# Patient Record
Sex: FEMALE | Race: WHITE | NOT HISPANIC OR LATINO | Employment: OTHER | URBAN - NONMETROPOLITAN AREA
[De-identification: names, ages, dates, MRNs, and addresses within clinical notes are randomized per-mention and may not be internally consistent; named-entity substitution may affect disease eponyms.]

---

## 2017-01-04 ENCOUNTER — HOSPITAL ENCOUNTER (OUTPATIENT)
Dept: MAMMOGRAPHY | Facility: HOSPITAL | Age: 62
Discharge: HOME | End: 2017-01-04
Admitting: GENERAL PRACTICE

## 2017-01-04 DIAGNOSIS — Z12.31 ENCOUNTER FOR SCREENING MAMMOGRAM FOR BREAST CANCER: ICD-10-CM

## 2017-01-04 PROCEDURE — 77063 BREAST TOMOSYNTHESIS BI: CPT

## 2017-01-04 PROCEDURE — G0202 SCR MAMMO BI INCL CAD: HCPCS

## 2017-02-08 ENCOUNTER — TRANSCRIBE ORDERS (OUTPATIENT)
Dept: ADMINISTRATIVE | Facility: HOSPITAL | Age: 62
End: 2017-02-08

## 2017-02-08 DIAGNOSIS — G47.30 SLEEP APNEA, UNSPECIFIED TYPE: Primary | ICD-10-CM

## 2017-02-08 DIAGNOSIS — R06.89 GASPING FOR BREATH: ICD-10-CM

## 2017-03-27 ENCOUNTER — TELEPHONE (OUTPATIENT)
Dept: NEUROLOGY | Age: 62
End: 2017-03-27

## 2017-04-22 ENCOUNTER — APPOINTMENT (OUTPATIENT)
Dept: GENERAL RADIOLOGY | Facility: HOSPITAL | Age: 62
End: 2017-04-22

## 2017-04-22 PROCEDURE — 71020 HC CHEST PA AND LATERAL: CPT

## 2017-05-19 ENCOUNTER — TELEPHONE (OUTPATIENT)
Dept: NEUROLOGY | Age: 62
End: 2017-05-19

## 2017-05-19 ENCOUNTER — OFFICE VISIT (OUTPATIENT)
Dept: NEUROLOGY | Age: 62
End: 2017-05-19
Payer: MEDICARE

## 2017-05-19 VITALS
WEIGHT: 152 LBS | BODY MASS INDEX: 26.93 KG/M2 | DIASTOLIC BLOOD PRESSURE: 90 MMHG | SYSTOLIC BLOOD PRESSURE: 145 MMHG | HEART RATE: 82 BPM | OXYGEN SATURATION: 99 % | HEIGHT: 63 IN

## 2017-05-19 DIAGNOSIS — Z78.9 DIFFICULTY USING BIPHASIC POSITIVE AIRWAY PRESSURE (BIPAP) MACHINE: ICD-10-CM

## 2017-05-19 DIAGNOSIS — G47.33 OBSTRUCTIVE SLEEP APNEA: Primary | ICD-10-CM

## 2017-05-19 PROCEDURE — G8419 CALC BMI OUT NRM PARAM NOF/U: HCPCS | Performed by: PHYSICIAN ASSISTANT

## 2017-05-19 PROCEDURE — 1036F TOBACCO NON-USER: CPT | Performed by: PHYSICIAN ASSISTANT

## 2017-05-19 PROCEDURE — 99203 OFFICE O/P NEW LOW 30 MIN: CPT | Performed by: PHYSICIAN ASSISTANT

## 2017-05-19 PROCEDURE — 3017F COLORECTAL CA SCREEN DOC REV: CPT | Performed by: PHYSICIAN ASSISTANT

## 2017-05-19 PROCEDURE — G8427 DOCREV CUR MEDS BY ELIG CLIN: HCPCS | Performed by: PHYSICIAN ASSISTANT

## 2017-05-19 PROCEDURE — 3014F SCREEN MAMMO DOC REV: CPT | Performed by: PHYSICIAN ASSISTANT

## 2017-05-19 RX ORDER — LORAZEPAM 0.5 MG/1
TABLET ORAL
COMMUNITY
Start: 2017-05-18

## 2017-05-19 RX ORDER — ESTRADIOL 0.05 MG/D
PATCH TRANSDERMAL
COMMUNITY
Start: 2017-04-03

## 2017-05-19 RX ORDER — HYDROCODONE BITARTRATE AND ACETAMINOPHEN 7.5; 325 MG/1; MG/1
TABLET ORAL
COMMUNITY
Start: 2017-05-10 | End: 2017-05-19

## 2017-05-19 RX ORDER — AZELASTINE 1 MG/ML
SPRAY, METERED NASAL
COMMUNITY
Start: 2017-05-10

## 2017-05-19 RX ORDER — FLUTICASONE PROPIONATE 50 MCG
SPRAY, SUSPENSION (ML) NASAL
COMMUNITY
Start: 2017-05-10

## 2017-05-19 RX ORDER — MONTELUKAST SODIUM 10 MG/1
TABLET ORAL
COMMUNITY
Start: 2017-02-09

## 2017-05-19 RX ORDER — OMEPRAZOLE 40 MG/1
CAPSULE, DELAYED RELEASE ORAL
COMMUNITY
Start: 2017-04-10

## 2017-05-19 RX ORDER — SIMVASTATIN 40 MG
TABLET ORAL
COMMUNITY
Start: 2016-10-13

## 2017-05-31 ENCOUNTER — HOSPITAL ENCOUNTER (OUTPATIENT)
Dept: SLEEP CENTER | Age: 62
Discharge: HOME | End: 2017-05-31
Payer: MEDICARE

## 2017-05-31 PROCEDURE — G0399 HOME SLEEP TEST/TYPE 3 PORTA: HCPCS

## 2017-05-31 PROCEDURE — 95806 SLEEP STUDY UNATT&RESP EFFT: CPT | Performed by: PSYCHIATRY & NEUROLOGY

## 2017-11-13 ENCOUNTER — OFFICE VISIT (OUTPATIENT)
Dept: NEUROLOGY | Age: 62
End: 2017-11-13
Payer: MEDICARE

## 2017-11-13 VITALS
BODY MASS INDEX: 28.21 KG/M2 | OXYGEN SATURATION: 100 % | WEIGHT: 159.2 LBS | HEIGHT: 63 IN | SYSTOLIC BLOOD PRESSURE: 156 MMHG | DIASTOLIC BLOOD PRESSURE: 79 MMHG | HEART RATE: 91 BPM

## 2017-11-13 DIAGNOSIS — R53.83 OTHER FATIGUE: ICD-10-CM

## 2017-11-13 DIAGNOSIS — R06.81 WITNESSED EPISODE OF APNEA: Primary | ICD-10-CM

## 2017-11-13 DIAGNOSIS — R40.0 DAYTIME SOMNOLENCE: ICD-10-CM

## 2017-11-13 DIAGNOSIS — G47.09 OTHER INSOMNIA: ICD-10-CM

## 2017-11-13 PROCEDURE — G8427 DOCREV CUR MEDS BY ELIG CLIN: HCPCS | Performed by: PHYSICIAN ASSISTANT

## 2017-11-13 PROCEDURE — G8484 FLU IMMUNIZE NO ADMIN: HCPCS | Performed by: PHYSICIAN ASSISTANT

## 2017-11-13 PROCEDURE — 3014F SCREEN MAMMO DOC REV: CPT | Performed by: PHYSICIAN ASSISTANT

## 2017-11-13 PROCEDURE — G8417 CALC BMI ABV UP PARAM F/U: HCPCS | Performed by: PHYSICIAN ASSISTANT

## 2017-11-13 PROCEDURE — 1036F TOBACCO NON-USER: CPT | Performed by: PHYSICIAN ASSISTANT

## 2017-11-13 PROCEDURE — 99214 OFFICE O/P EST MOD 30 MIN: CPT | Performed by: PHYSICIAN ASSISTANT

## 2017-11-13 PROCEDURE — 3017F COLORECTAL CA SCREEN DOC REV: CPT | Performed by: PHYSICIAN ASSISTANT

## 2017-11-13 RX ORDER — MAGNESIUM HYDROXIDE/ALUMINUM HYDROXICE/SIMETHICONE 120; 1200; 1200 MG/30ML; MG/30ML; MG/30ML
5 SUSPENSION ORAL PRN
COMMUNITY

## 2017-11-13 RX ORDER — OXYCODONE AND ACETAMINOPHEN 7.5; 325 MG/1; MG/1
1 TABLET ORAL 3 TIMES DAILY
COMMUNITY
Start: 2017-11-07

## 2017-11-13 NOTE — PATIENT INSTRUCTIONS
Patient Education     Instructions:    1. Melatonin 3 mg take 2 hours before bedtime  2. Follow up with your PMD for consultation for Buspar and labs  3. Discussed that Ativan and Percocet are respiratory depressants  4. Will order an in lab sleep study  5. Will order labs to add to your PMD labs-TSH and T4 (Thyroid)     Fatigue: Care Instructions  Your Care Instructions  Fatigue is a feeling of tiredness, exhaustion, or lack of energy. You may feel fatigue because of too much or not enough activity. It can also come from stress, lack of sleep, boredom, and poor diet. Many medical problems, such as viral infections, can cause fatigue. Emotional problems, especially depression, are often the cause of fatigue. Fatigue is most often a symptom of another problem. Treatment for fatigue depends on the cause. For example, if you have fatigue because you have a certain health problem, treating this problem also treats your fatigue. If depression or anxiety is the cause, treatment may help. Follow-up care is a key part of your treatment and safety. Be sure to make and go to all appointments, and call your doctor if you are having problems. It's also a good idea to know your test results and keep a list of the medicines you take. How can you care for yourself at home? · Get regular exercise. But don't overdo it. Go back and forth between rest and exercise. · Get plenty of rest.  · Eat a healthy diet. Do not skip meals, especially breakfast.  · Reduce your use of caffeine, tobacco, and alcohol. Caffeine is most often found in coffee, tea, cola drinks, and chocolate. · Limit medicines that can cause fatigue. This includes tranquilizers and cold and allergy medicines. When should you call for help? Watch closely for changes in your health, and be sure to contact your doctor if:  · You have new symptoms such as fever or a rash. · Your fatigue gets worse. · You have been feeling down, depressed, or hopeless.  Or you doctor can tell you if your medicine may do this and if you can take it earlier in the day. If you can't sleep  · Imagine yourself in a peaceful, pleasant scene. Focus on the details and feelings of being in a place that is relaxing. · Get up and do a quiet or boring activity until you feel sleepy. · Don't drink any liquids after 6 p.m. if you wake up often because you have to go to the bathroom. Where can you learn more? Go to https://SYLOB.[employer]. org and sign in to your [employer] account. Enter N896 in the [employer] box to learn more about \"Learning About Sleeping Well. \"     If you do not have an account, please click on the \"Sign Up Now\" link. Current as of: July 26, 2016  Content Version: 11.3  © 4402-9683 [employer]. Care instructions adapted under license by [facility] ([facility]). If you have questions about a medical condition or this instruction, always ask your healthcare professional. [address] any warranty or liability for your use of this information.

## 2017-11-13 NOTE — PROGRESS NOTES
[facility] Sleep Follow Up Encounter      Information:   Patient Name: Lani Arellano  :   1955  Age:   60 y.o. MRN:   [id number]  Account #:  [de-identified]  Today:                17    Provider:  Jyoti Vazquez PA-C    Chief Complaint   Patient presents with    Sleep Apnea     Patient is having trouble sleeping        Subjective:   Lani Arellano is a 60 y.o. woman  with a history of previously diagnosed mild DEMARCO who comes in for a sleep clinic follow up. At her last office visit, 2017 she was to proceed with a repeat PSG. The previous PSG, 2017 at [facility] revealed an AHI of 7.9 and an RDI of 15.3. She was prescribed BiPAP at 14cm/10cm. She was intolerant to the BiPAP pressure and complained of abdominal bloating. The pressure was lowered to 12cm/8cm and she followed up here. She did return the BiPAP. She complained of continued fatigue and daytime drowsiness. She was referred for an HST. The HST, 2017 revealed no significant snoring or sleep apnea. She did sleep with the HOB elevated during the HST. She doesn't feel that the HST was accurate and may a false negative. She complains of insomnia both initiating and maintaining sleep. She has frequent awakenings. She wakes up every hour. She awakes with a gasp each hour. She thinks she is having apneas. She awakens with a panicky feeling. She is unsure of snoring. She has nocturia up to 3 x per night. She complains of excessive daytime somnolence. She has tried Ativan 0.5 mg 1/2 q hs with no alleviation of the insomnia. She is scheduled for labs per her PMD the end of the month. She denies chest pain, dyspnea, and palpitations. She has gained 7 lbs since the last office visit. She desires to have a repeat PSG.       Objective:     Past Medical History:   Diagnosis Date    BiPAP (biphasic positive airway pressure) dependence     14cm/10cm    Degenerative disc disease, cervical     Degenerative disc disease, lumbar     Deviated septum     GERD (gastroesophageal reflux disease)     History of multiple allergies     Hyperlipidemia     IBS (irritable bowel syndrome)     Obstructive sleep apnea     AHI:  7.9; RDI: 15.3  per PSG, 2/ 2017-[facility]; no significant snoring or apnea on HST, 5/2017    Panic attacks        Past Surgical History:   Procedure Laterality Date    HYSTERECTOMY  2000   Ankur 60    VAGINAL PROLAPSE REPAIR  2015       Recent Hospitalizations  ·     Significant Injuries  ·     History reviewed. No pertinent family history. Social History  History   Smoking Status    Former Smoker    Quit date: 2007   Smokeless Tobacco    Never Used     History   Alcohol Use No     History   Drug Use No         Current Outpatient Prescriptions   Medication Sig Dispense Refill    oxyCODONE-acetaminophen (PERCOCET) 7.5-325 MG per tablet Take 1 tablet by mouth 3 times daily .  aluminum & magnesium hydroxide-simethicone (MYLANTA) 200-200-20 MG/5ML SUSP suspension Take 5 mLs by mouth as needed for Indigestion      azelastine (ASTELIN) 0.1 % nasal spray       estradiol (CLIMARA) 0.05 MG/24HR       fluticasone (FLONASE) 50 MCG/ACT nasal spray       LORazepam (ATIVAN) 0.5 MG tablet       montelukast (SINGULAIR) 10 MG tablet       simvastatin (ZOCOR) 40 MG tablet       GuaiFENesin (MUCINEX PO) Take by mouth as needed      Hyoscyamine Sulfate (NULEV PO) Take by mouth as needed      omeprazole (PRILOSEC) 40 MG delayed release capsule       FIBER PO Take by mouth daily      Loratadine (CLARITIN PO) Take by mouth as needed       No current facility-administered medications for this visit. Allergies:  Beta adrenergic blockers; Meperidine; Morphine and related; Nsaids; Penicillins;  Tetracyclines & related; and Tramadol    REVIEW OF SYSTEMS     Constitutional: []Fever []Sweats []Chills [] Recent Injury   [x] Denies all unless marked  HENT:[]Headache  [] Head Injury  [] Sore Throat

## 2018-01-31 ENCOUNTER — TRANSCRIBE ORDERS (OUTPATIENT)
Dept: ADMINISTRATIVE | Facility: HOSPITAL | Age: 63
End: 2018-01-31

## 2018-01-31 DIAGNOSIS — Z12.31 ENCOUNTER FOR SCREENING MAMMOGRAM FOR MALIGNANT NEOPLASM OF BREAST: Primary | ICD-10-CM

## 2018-02-16 ENCOUNTER — HOSPITAL ENCOUNTER (OUTPATIENT)
Dept: MAMMOGRAPHY | Facility: HOSPITAL | Age: 63
Discharge: HOME | End: 2018-02-16
Admitting: GENERAL PRACTICE

## 2018-02-16 DIAGNOSIS — Z12.31 ENCOUNTER FOR SCREENING MAMMOGRAM FOR MALIGNANT NEOPLASM OF BREAST: ICD-10-CM

## 2018-02-16 PROCEDURE — 77063 BREAST TOMOSYNTHESIS BI: CPT

## 2018-02-16 PROCEDURE — 77067 SCR MAMMO BI INCL CAD: CPT

## 2018-03-30 ENCOUNTER — TRANSCRIBE ORDERS (OUTPATIENT)
Dept: ADMINISTRATIVE | Facility: HOSPITAL | Age: 63
End: 2018-03-30

## 2018-03-30 DIAGNOSIS — M54.16 LUMBAR RADICULOPATHY: Primary | ICD-10-CM

## 2018-04-02 ENCOUNTER — HOSPITAL ENCOUNTER (OUTPATIENT)
Dept: MRI IMAGING | Facility: HOSPITAL | Age: 63
Discharge: HOME | End: 2018-04-02
Admitting: GENERAL PRACTICE

## 2018-04-02 DIAGNOSIS — M54.16 LUMBAR RADICULOPATHY: ICD-10-CM

## 2018-04-02 PROCEDURE — 72148 MRI LUMBAR SPINE W/O DYE: CPT

## 2018-05-22 ENCOUNTER — OFFICE VISIT (OUTPATIENT)
Dept: GASTROENTEROLOGY | Facility: CLINIC | Age: 63
End: 2018-05-22

## 2018-05-22 VITALS
WEIGHT: 165 LBS | HEIGHT: 63 IN | OXYGEN SATURATION: 98 % | BODY MASS INDEX: 29.23 KG/M2 | SYSTOLIC BLOOD PRESSURE: 152 MMHG | DIASTOLIC BLOOD PRESSURE: 78 MMHG | HEART RATE: 71 BPM | TEMPERATURE: 97.2 F

## 2018-05-22 DIAGNOSIS — K59.09 CHRONIC CONSTIPATION: ICD-10-CM

## 2018-05-22 DIAGNOSIS — Z80.0 FAMILY HX OF COLON CANCER: ICD-10-CM

## 2018-05-22 DIAGNOSIS — Z86.010 HX OF COLONIC POLYP: Primary | ICD-10-CM

## 2018-05-22 PROCEDURE — 99213 OFFICE O/P EST LOW 20 MIN: CPT | Performed by: NURSE PRACTITIONER

## 2018-05-22 NOTE — PROGRESS NOTES
[facility] Gastroenterology    Primary Physician Al Ramos MD    5/22/2018    Joi Mcqueen   1955      Chief Complaint   Patient presents with   • Colonoscopy   worsening constipation     Subjective     HPI    Joi Mcqueen is a 63 y.o. female who presents with worsening constipation. Has had constipation for years.  Has worsened since 9/2017.  Taking fiber suppl daily, milk of magnesia, and prune juice daily.  She is having bm daily.   She has no complaints of nausea or vomiting.  No wt loss. No BRBPR. No melena. No abdominal pain.  She does take pain med daily for back pain.          Last colonoscopy was 4/2015, polyp, path hyperplastic, recall rec 3 yr.    The patient does  have history of colon polyps. The patient does not  have history of colon cancer.   There is no family history of colon polyps. There is  family history of colon cancer maternal aunt in her late 50's. .     Past Medical History:   Diagnosis Date   • Chronic back pain    • Chronic neck pain    • Claustrophobia    • Colon polyp    • DDD (degenerative disc disease), lumbar    • GERD (gastroesophageal reflux disease)    • Hyperlipidemia    • Hyperlipidemia    • IBS (irritable bowel syndrome)    • Low back pain    • Peptic ulcer    • Sleep apnea        Past Surgical History:   Procedure Laterality Date   • BACK SURGERY     • BLADDER SUSPENSION     • CATARACT EXTRACTION Bilateral    • COLONOSCOPY  04/16/2015   • ENDOSCOPY  02/17/2004   • HYSTERECTOMY         Outpatient Prescriptions Marked as Taking for the 5/22/18 encounter (Office Visit) with BERT Bobo   Medication Sig Dispense Refill   • azelastine (ASTELIN) 0.1 % nasal spray   9   • Calcium & Magnesium Carbonates (MYLANTA PO) Take  by mouth As Needed.     • Celecoxib (CELEBREX PO) Take  by mouth. At pharmacy. Had not started as of today     • Coenzyme Q10 (CO Q 10 PO) Take  by mouth Daily.     • estradiol (CLIMARA) 0.05 MG/24HR patch   3   •  fluticasone (FLONASE) 50 MCG/ACT nasal spray   3   • GuaiFENesin (MUCINEX PO) Take  by mouth Daily.     • Hyoscyamine Sulfate (NULEV PO) Take  by mouth As Needed.     • Loratadine (CLARITIN PO) Take  by mouth As Needed.     • LORazepam (ATIVAN) 0.5 MG tablet As Needed.     • Misc Natural Products (FIBER 7 PO) Take  by mouth.     • montelukast (SINGULAIR) 10 MG tablet   3   • oxyCODONE-acetaminophen (PERCOCET) 7.5-325 MG per tablet Take  by mouth.     • Probiotic Product (PROBIOTIC & ACIDOPHILUS EX ST PO) Take  by mouth.     • simvastatin (ZOCOR) 40 MG tablet   1       Allergies   Allergen Reactions   • Beta Adrenergic Blockers Anaphylaxis   • Penicillins Anaphylaxis   • Demerol [Meperidine] Other (See Comments)     Paul not help   • Erythromycin Other (See Comments)     Pt states she in no longer allergic   • Morphine And Related Other (See Comments)     Does not help   • Nsaids Other (See Comments)     reflux   • Tetracyclines & Related Nausea And Vomiting   • Tramadol Nausea And Vomiting       Social History     Social History   • Marital status:      Spouse name: N/A   • Number of children: N/A   • Years of education: N/A     Occupational History   • Not on file.     Social History Main Topics   • Smoking status: Former Smoker     Types: Cigarettes     Quit date: 2006   • Smokeless tobacco: Never Used   • Alcohol use No   • Drug use: No   • Sexual activity: Defer     Other Topics Concern   • Not on file     Social History Narrative   • No narrative on file       Family History   Problem Relation Age of Onset   • Hypertension Mother    • Cancer Mother         lung   • Cancer Father         leukemia   • Cancer Brother         cancer caused by asbestos   • Colon cancer Maternal Aunt    • Colon polyps Neg Hx        Review of Systems   Constitutional: Negative for chills, fever and unexpected weight change.   Respiratory: Negative for cough, shortness of breath and wheezing.    Cardiovascular: Negative for chest  pain and palpitations.   Gastrointestinal: Positive for constipation. Negative for abdominal distention, abdominal pain, anal bleeding, blood in stool, diarrhea, nausea, rectal pain and vomiting.   Musculoskeletal: Positive for back pain (chronic. ).       Objective     Vitals:    05/22/18 0909   BP: 152/78   Pulse: 71   Temp: 97.2 °F (36.2 °C)   SpO2: 98%     1    05/22/18 0909   Weight: 74.8 kg (165 lb)     Body mass index is 29.23 kg/m².    Physical Exam   Constitutional: She is oriented to person, place, and time. She appears well-developed and well-nourished. No distress.   Cardiovascular: Normal rate, regular rhythm and normal heart sounds.    Pulmonary/Chest: Effort normal and breath sounds normal.   Abdominal: Soft. Bowel sounds are normal. She exhibits no distension and no mass. There is no tenderness. There is no rebound and no guarding.   Musculoskeletal: She exhibits no edema.   Neurological: She is alert and oriented to person, place, and time.   Skin: Skin is warm and dry.   Psychiatric: She has a normal mood and affect. Her behavior is normal.   Vitals reviewed.      Imaging Results (most recent)     None          Assessment/Plan     Joi was seen today for colonoscopy.    Diagnoses and all orders for this visit:    Hx of colonic polyp  -     Case Request; Standing  -     Case Request    Chronic constipation  Comments:  worsening since 9/2017    Family hx of colon cancer    Other orders  -     Implement Anesthesia Orders Day of Procedure; Standing  -     Obtain Informed Consent; Standing  -     magnesium citrate solution; Take 4 bottles as directed         plan for colonoscopy.  In regards to worsening constipation recommend continue daily fiber suppl, prune juice, and increased water intake.  Also discussed taking miralax daily.      Body mass index is 29.23 kg/m².    Patient's Body mass index is 29.23 kg/m². BMI is above normal parameters. Recommendations include: no follow-up  required.      COLONOSCOPY WITH ANESTHESIA (N/A)  All risks, benefits, alternatives, and indications of colonoscopy procedure have been discussed with the patient. Risks to include perforation of the colon requiring possible surgery or colostomy, risk of bleeding from biopsies or removal of colon tissue, possibility of missing a colon polyp or cancer, or adverse drug reaction.  Benefits to include the diagnosis and management of disease of the colon and rectum. Alternatives to include barium enema, radiographic evaluation, lab testing or no intervention. Pt verbalizes understanding and agrees.     There are no Patient Instructions on file for this visit.    BERT Guzman      EMR Dragon/transcription disclaimer:  Much of this encounter note is electronic transcription/translation of spoken language to printed text.  The electronic translation of spoken language may be erroneous, or at times, nonsensical words or phrases may be inadvertently transcribed.  Although I have reviewed the note for such errors, some may still exist.

## 2018-05-23 PROBLEM — Z86.0100 HX OF COLONIC POLYP: Status: ACTIVE | Noted: 2018-05-23

## 2018-05-23 PROBLEM — Z86.010 HX OF COLONIC POLYP: Status: ACTIVE | Noted: 2018-05-23

## 2018-05-30 ENCOUNTER — LAB REQUISITION (OUTPATIENT)
Dept: LAB | Facility: HOSPITAL | Age: 63
End: 2018-05-30

## 2018-05-30 DIAGNOSIS — Z01.419 ENCOUNTER FOR GYNECOLOGICAL EXAMINATION WITHOUT ABNORMAL FINDING: ICD-10-CM

## 2018-05-30 PROCEDURE — G0123 SCREEN CERV/VAG THIN LAYER: HCPCS | Performed by: GENERAL PRACTICE

## 2018-05-31 ENCOUNTER — HOSPITAL ENCOUNTER (OUTPATIENT)
Facility: HOSPITAL | Age: 63
Setting detail: HOSPITAL OUTPATIENT SURGERY
Discharge: HOME | End: 2018-05-31
Attending: INTERNAL MEDICINE | Admitting: INTERNAL MEDICINE

## 2018-05-31 ENCOUNTER — ANESTHESIA EVENT (OUTPATIENT)
Dept: GASTROENTEROLOGY | Facility: HOSPITAL | Age: 63
End: 2018-05-31

## 2018-05-31 ENCOUNTER — ANESTHESIA (OUTPATIENT)
Dept: GASTROENTEROLOGY | Facility: HOSPITAL | Age: 63
End: 2018-05-31

## 2018-05-31 VITALS
WEIGHT: 160 LBS | SYSTOLIC BLOOD PRESSURE: 139 MMHG | RESPIRATION RATE: 15 BRPM | DIASTOLIC BLOOD PRESSURE: 74 MMHG | HEIGHT: 63 IN | TEMPERATURE: 97.8 F | OXYGEN SATURATION: 100 % | HEART RATE: 69 BPM | BODY MASS INDEX: 28.35 KG/M2

## 2018-05-31 DIAGNOSIS — Z86.010 HX OF COLONIC POLYP: ICD-10-CM

## 2018-05-31 LAB
GEN CATEG CVX/VAG CYTO-IMP: NORMAL
LAB AP CASE REPORT: NORMAL
LAB AP GYN ADDITIONAL INFORMATION: NORMAL
LAB AP GYN OTHER FINDINGS: NORMAL
Lab: NORMAL
PATH INTERP SPEC-IMP: NORMAL
STAT OF ADQ CVX/VAG CYTO-IMP: NORMAL

## 2018-05-31 PROCEDURE — 45385 COLONOSCOPY W/LESION REMOVAL: CPT | Performed by: INTERNAL MEDICINE

## 2018-05-31 PROCEDURE — 88305 TISSUE EXAM BY PATHOLOGIST: CPT | Performed by: INTERNAL MEDICINE

## 2018-05-31 PROCEDURE — 25010000002 PROPOFOL 10 MG/ML EMULSION: Performed by: NURSE ANESTHETIST, CERTIFIED REGISTERED

## 2018-05-31 RX ORDER — SODIUM CHLORIDE 9 MG/ML
500 INJECTION, SOLUTION INTRAVENOUS CONTINUOUS PRN
Status: DISCONTINUED | OUTPATIENT
Start: 2018-05-31 | End: 2018-05-31 | Stop reason: HOSPADM

## 2018-05-31 RX ORDER — PROPOFOL 10 MG/ML
VIAL (ML) INTRAVENOUS AS NEEDED
Status: DISCONTINUED | OUTPATIENT
Start: 2018-05-31 | End: 2018-05-31 | Stop reason: SURG

## 2018-05-31 RX ORDER — SODIUM CHLORIDE 0.9 % (FLUSH) 0.9 %
3 SYRINGE (ML) INJECTION AS NEEDED
Status: DISCONTINUED | OUTPATIENT
Start: 2018-05-31 | End: 2018-05-31 | Stop reason: HOSPADM

## 2018-05-31 RX ORDER — SODIUM CHLORIDE 9 MG/ML
100 INJECTION, SOLUTION INTRAVENOUS CONTINUOUS
Status: CANCELLED | OUTPATIENT
Start: 2018-05-31

## 2018-05-31 RX ORDER — ONDANSETRON 2 MG/ML
4 INJECTION INTRAMUSCULAR; INTRAVENOUS ONCE AS NEEDED
Status: DISCONTINUED | OUTPATIENT
Start: 2018-05-31 | End: 2018-05-31 | Stop reason: HOSPADM

## 2018-05-31 RX ORDER — SODIUM CHLORIDE 0.9 % (FLUSH) 0.9 %
1-10 SYRINGE (ML) INJECTION AS NEEDED
Status: CANCELLED | OUTPATIENT
Start: 2018-05-31

## 2018-05-31 RX ADMIN — PROPOFOL 30 MG: 10 INJECTION, EMULSION INTRAVENOUS at 08:35

## 2018-05-31 RX ADMIN — PROPOFOL 30 MG: 10 INJECTION, EMULSION INTRAVENOUS at 08:39

## 2018-05-31 RX ADMIN — LIDOCAINE HYDROCHLORIDE 0.5 ML: 10 INJECTION, SOLUTION EPIDURAL; INFILTRATION; INTRACAUDAL; PERINEURAL at 07:08

## 2018-05-31 RX ADMIN — PROPOFOL 30 MG: 10 INJECTION, EMULSION INTRAVENOUS at 08:37

## 2018-05-31 RX ADMIN — SODIUM CHLORIDE 500 ML: 9 INJECTION, SOLUTION INTRAVENOUS at 07:09

## 2018-05-31 RX ADMIN — PROPOFOL 30 MG: 10 INJECTION, EMULSION INTRAVENOUS at 08:42

## 2018-05-31 RX ADMIN — PROPOFOL 30 MG: 10 INJECTION, EMULSION INTRAVENOUS at 08:41

## 2018-05-31 RX ADMIN — PROPOFOL 70 MG: 10 INJECTION, EMULSION INTRAVENOUS at 08:33

## 2018-05-31 NOTE — ANESTHESIA POSTPROCEDURE EVALUATION
Patient: Joi Mcqueen    Procedure Summary     Date:  05/31/18 Room / Location:  [facility] ENDOSCOPY 4 / [facility] ENDOSCOPY    Anesthesia Start:  0829 Anesthesia Stop:  0852    Procedure:  COLONOSCOPY WITH ANESTHESIA (N/A ) Diagnosis:       Hx of colonic polyp      (Hx of colonic polyp [Z86.010])    Surgeon:  Jed Lutz MD Provider:  Vernon Dawn CRNA    Anesthesia Type:  general ASA Status:  2          Anesthesia Type: general  Last vitals  BP   139/77 (05/31/18 0649)   Temp   97.8 °F (36.6 °C) (05/31/18 0649)   Pulse   75 (05/31/18 0649)   Resp   18 (05/31/18 0649)     SpO2   97 % (05/31/18 0649)     Post Anesthesia Care and Evaluation    Patient location during evaluation: PHASE II  Patient participation: complete - patient participated  Level of consciousness: awake  Pain score: 0  Pain management: adequate  Airway patency: patent  Anesthetic complications: No anesthetic complications  PONV Status: none  Cardiovascular status: acceptable  Respiratory status: acceptable  Hydration status: acceptable

## 2018-05-31 NOTE — ANESTHESIA PREPROCEDURE EVALUATION
Anesthesia Evaluation     no history of anesthetic complications:  NPO Solid Status: > 8 hours  NPO Liquid Status: > 2 hours           Airway   Mallampati: I  TM distance: >3 FB  Neck ROM: full  Dental          Pulmonary - normal exam    breath sounds clear to auscultation  (+) sleep apnea (Not compliant cith CPAP),   (-) asthma, recent URI, not a smoker  Cardiovascular - normal exam  Exercise tolerance: good (4-7 METS)    Rhythm: regular  Rate: normal    (+) hyperlipidemia,   (-) pacemaker, hypertension, past MI, angina, cardiac stents, CABG      Neuro/Psych  (-) seizures, TIA, CVA  GI/Hepatic/Renal/Endo    (+)  GERD well controlled,    (-) liver disease, no renal disease, diabetes, hypothyroidism, hyperthyroidism    Musculoskeletal     Abdominal    Substance History      OB/GYN          Other                        Anesthesia Plan    ASA 2     general   total IV anesthesia  intravenous induction   Anesthetic plan and risks discussed with patient.

## 2018-06-01 LAB
CYTO UR: NORMAL
LAB AP CASE REPORT: NORMAL
Lab: NORMAL
PATH REPORT.FINAL DX SPEC: NORMAL
PATH REPORT.GROSS SPEC: NORMAL

## 2019-04-30 ENCOUNTER — TRANSCRIBE ORDERS (OUTPATIENT)
Dept: ADMINISTRATIVE | Facility: HOSPITAL | Age: 64
End: 2019-04-30

## 2019-04-30 DIAGNOSIS — Z12.31 ENCOUNTER FOR SCREENING MAMMOGRAM FOR MALIGNANT NEOPLASM OF BREAST: Primary | ICD-10-CM

## 2019-05-10 ENCOUNTER — HOSPITAL ENCOUNTER (OUTPATIENT)
Dept: MAMMOGRAPHY | Facility: HOSPITAL | Age: 64
Discharge: HOME | End: 2019-05-10
Admitting: GENERAL PRACTICE

## 2019-05-10 DIAGNOSIS — Z12.31 ENCOUNTER FOR SCREENING MAMMOGRAM FOR MALIGNANT NEOPLASM OF BREAST: ICD-10-CM

## 2019-05-10 PROCEDURE — 77067 SCR MAMMO BI INCL CAD: CPT

## 2019-05-10 PROCEDURE — 77063 BREAST TOMOSYNTHESIS BI: CPT

## 2019-10-19 ENCOUNTER — HOSPITAL ENCOUNTER (OUTPATIENT)
Dept: GENERAL RADIOLOGY | Facility: HOSPITAL | Age: 64
Discharge: HOME | End: 2019-10-19
Admitting: FAMILY MEDICINE

## 2019-10-19 PROCEDURE — 71046 X-RAY EXAM CHEST 2 VIEWS: CPT

## 2020-05-19 ENCOUNTER — OFFICE VISIT (OUTPATIENT)
Dept: INTERNAL MEDICINE | Facility: CLINIC | Age: 65
End: 2020-05-19

## 2020-05-19 ENCOUNTER — RESULTS ENCOUNTER (OUTPATIENT)
Dept: INTERNAL MEDICINE | Facility: CLINIC | Age: 65
End: 2020-05-19

## 2020-05-19 VITALS
BODY MASS INDEX: 30.12 KG/M2 | HEIGHT: 63 IN | HEART RATE: 90 BPM | WEIGHT: 170 LBS | TEMPERATURE: 97.1 F | OXYGEN SATURATION: 100 % | SYSTOLIC BLOOD PRESSURE: 122 MMHG | DIASTOLIC BLOOD PRESSURE: 74 MMHG

## 2020-05-19 DIAGNOSIS — K21.9 GASTROESOPHAGEAL REFLUX DISEASE WITHOUT ESOPHAGITIS: ICD-10-CM

## 2020-05-19 DIAGNOSIS — G47.33 OBSTRUCTIVE SLEEP APNEA: ICD-10-CM

## 2020-05-19 DIAGNOSIS — E78.2 MIXED HYPERLIPIDEMIA: ICD-10-CM

## 2020-05-19 DIAGNOSIS — K58.2 IRRITABLE BOWEL SYNDROME WITH BOTH CONSTIPATION AND DIARRHEA: Primary | ICD-10-CM

## 2020-05-19 DIAGNOSIS — K58.2 IRRITABLE BOWEL SYNDROME WITH BOTH CONSTIPATION AND DIARRHEA: ICD-10-CM

## 2020-05-19 PROBLEM — Z78.9 DIFFICULTY USING BIPHASIC POSITIVE AIRWAY PRESSURE (BIPAP) MACHINE: Status: ACTIVE | Noted: 2017-05-19

## 2020-05-19 PROBLEM — E78.5 HYPERLIPIDEMIA: Status: ACTIVE | Noted: 2020-05-19

## 2020-05-19 PROBLEM — R06.81 WITNESSED EPISODE OF APNEA: Status: ACTIVE | Noted: 2017-11-13

## 2020-05-19 PROBLEM — G47.09 OTHER INSOMNIA: Status: ACTIVE | Noted: 2017-11-13

## 2020-05-19 PROCEDURE — 99214 OFFICE O/P EST MOD 30 MIN: CPT | Performed by: INTERNAL MEDICINE

## 2020-05-19 RX ORDER — OMEPRAZOLE 20 MG/1
20 CAPSULE, DELAYED RELEASE ORAL 2 TIMES DAILY PRN
COMMUNITY
End: 2020-11-03 | Stop reason: SDUPTHER

## 2020-05-19 RX ORDER — HYOSCYAMINE SULFATE 0.125 MG
125 TABLET,DISINTEGRATING ORAL EVERY 6 HOURS PRN
Qty: 60 TABLET | Refills: 1 | Status: SHIPPED | OUTPATIENT
Start: 2020-05-19 | End: 2020-05-28

## 2020-05-19 RX ORDER — OMEPRAZOLE 20 MG/1
20 CAPSULE, DELAYED RELEASE ORAL DAILY
Qty: 90 CAPSULE | Refills: 1 | Status: SHIPPED | OUTPATIENT
Start: 2020-05-19 | End: 2020-11-03 | Stop reason: SDUPTHER

## 2020-05-19 RX ORDER — FLUTICASONE PROPIONATE 50 MCG
2 SPRAY, SUSPENSION (ML) NASAL DAILY
Qty: 16 G | Refills: 3 | Status: SHIPPED | OUTPATIENT
Start: 2020-05-19 | End: 2020-09-14

## 2020-05-19 RX ORDER — SIMVASTATIN 40 MG
40 TABLET ORAL NIGHTLY
Qty: 90 TABLET | Refills: 1 | Status: SHIPPED | OUTPATIENT
Start: 2020-05-19 | End: 2020-12-17

## 2020-05-19 RX ORDER — ESTRADIOL 0.05 MG/D
1 PATCH TRANSDERMAL WEEKLY
Qty: 12 PATCH | Refills: 2 | Status: SHIPPED | OUTPATIENT
Start: 2020-05-19 | End: 2021-01-19

## 2020-05-19 NOTE — PROGRESS NOTES
Pati Mcqueen is a 65 y.o. female.   Chief Complaint   Patient presents with   • Establish Care     new patient, needs medication refills, needs mammo order       This is a annual evaluation on patient with upper cholesterolemia irritable bowel syndrome chronic low back pain       The following portions of the patient's history were reviewed and updated as appropriate: allergies, current medications, past family history, past medical history, past social history, past surgical history and problem list.    Review of Systems   Constitutional: Negative for activity change, appetite change, fatigue, fever, unexpected weight gain and unexpected weight loss.   HENT: Negative for swollen glands, trouble swallowing and voice change.    Eyes: Negative for blurred vision and visual disturbance.   Respiratory: Negative for cough and shortness of breath.    Cardiovascular: Negative for chest pain, palpitations and leg swelling.   Gastrointestinal: Positive for GERD. Negative for abdominal pain, constipation, diarrhea, nausea, vomiting and indigestion.   Endocrine: Negative for cold intolerance, heat intolerance, polydipsia and polyphagia.   Genitourinary: Negative for dysuria and frequency.   Musculoskeletal: Positive for back pain. Negative for arthralgias, joint swelling and neck pain.   Skin: Negative for color change, rash and skin lesions.   Neurological: Negative for dizziness, weakness, headache, memory problem and confusion.   Hematological: Does not bruise/bleed easily.   Psychiatric/Behavioral: Negative for agitation, hallucinations and suicidal ideas. The patient is not nervous/anxious.        Objective   Past Medical History:   Diagnosis Date   • Chronic back pain    • Chronic neck pain    • Claustrophobia    • Colon polyp    • DDD (degenerative disc disease), lumbar    • GERD (gastroesophageal reflux disease)    • Hyperlipidemia    • Hyperlipidemia    • IBS (irritable bowel syndrome)    • Low back  pain    • Peptic ulcer    • Sleep apnea       Past Surgical History:   Procedure Laterality Date   • BACK SURGERY     • BLADDER SUSPENSION     • CATARACT EXTRACTION Bilateral    • COLONOSCOPY  04/16/2015   • COLONOSCOPY N/A 5/31/2018    Procedure: COLONOSCOPY WITH ANESTHESIA;  Surgeon: Jed Lutz MD;  Location: [facility] ENDOSCOPY;  Service: Gastroenterology   • ENDOSCOPY  02/17/2004   • HYSTERECTOMY          Current Outpatient Medications:   •  Calcium & Magnesium Carbonates (MYLANTA PO), Take 1 tablet by mouth As Needed., Disp: , Rfl:   •  estradiol (CLIMARA) 0.05 MG/24HR patch, Place 1 patch on the skin as directed by provider 1 (One) Time Per Week., Disp: 12 patch, Rfl: 2  •  fluticasone (FLONASE) 50 MCG/ACT nasal spray, 2 sprays into the nostril(s) as directed by provider Daily., Disp: 16 g, Rfl: 3  •  GuaiFENesin (MUCINEX PO), Take 1 tablet by mouth 2 (Two) Times a Day., Disp: , Rfl:   •  hyoscyamine sulfate (NuLev) 0.125 MG tablet dispersible disintegrating tablet, Place 1 tablet on the tongue Every 6 (Six) Hours As Needed (for IBS)., Disp: 60 tablet, Rfl: 1  •  Loratadine (CLARITIN PO), Take 1 tablet by mouth As Needed., Disp: , Rfl:   •  LORazepam (ATIVAN) 0.5 MG tablet, As Needed., Disp: , Rfl:   •  omeprazole (priLOSEC) 20 MG capsule, Take 20 mg by mouth 2 (Two) Times a Day As Needed., Disp: , Rfl:   •  oxyCODONE-acetaminophen (PERCOCET) 7.5-325 MG per tablet, Take 1 tablet by mouth 4 (Four) Times a Day., Disp: , Rfl:   •  simvastatin (ZOCOR) 40 MG tablet, Take 1 tablet by mouth Every Night., Disp: 90 tablet, Rfl: 1  •  omeprazole (PrilOSEC) 20 MG capsule, Take 1 capsule by mouth Daily., Disp: 90 capsule, Rfl: 1     Vitals:    05/19/20 1347   BP: 122/74   Pulse: 90   Temp: 97.1 °F (36.2 °C)   SpO2: 100%         05/19/20  1347   Weight: 77.1 kg (170 lb)     Patient's Body mass index is 30.11 kg/m². BMI is above normal parameters. Recommendations include: exercise counseling and nutrition  counseling.      Physical Exam   Constitutional: She is oriented to person, place, and time. She appears well-developed and well-nourished.   HENT:   Head: Normocephalic and atraumatic.   Right Ear: External ear normal.   Left Ear: External ear normal.   Nose: Nose normal.   Mouth/Throat: Oropharynx is clear and moist.   Eyes: Pupils are equal, round, and reactive to light. Conjunctivae and EOM are normal.   Neck: Normal range of motion. Neck supple. No thyromegaly present.   Cardiovascular: Normal rate, regular rhythm, normal heart sounds and intact distal pulses.   Pulmonary/Chest: Effort normal and breath sounds normal.   Abdominal: Soft. Bowel sounds are normal.   Lymphadenopathy:     She has no cervical adenopathy.   Neurological: She is alert and oriented to person, place, and time.   Skin: Skin is warm and dry.   Psychiatric: She has a normal mood and affect. Her behavior is normal. Thought content normal.   Nursing note and vitals reviewed.            Assessment/Plan   Diagnoses and all orders for this visit:    1. Irritable bowel syndrome with both constipation and diarrhea (Primary)  -     hyoscyamine sulfate (NuLev) 0.125 MG tablet dispersible disintegrating tablet; Place 1 tablet on the tongue Every 6 (Six) Hours As Needed (for IBS).  Dispense: 60 tablet; Refill: 1  -     Comprehensive Metabolic Panel; Future  -     Uric Acid; Future  -     Urinalysis With Microscopic - Urine, Clean Catch; Future    2. Obstructive sleep apnea    3. Gastroesophageal reflux disease without esophagitis  -     CBC & Differential; Future    4. Mixed hyperlipidemia  -     TSH; Future  -     Lipid Panel    Other orders  -     simvastatin (ZOCOR) 40 MG tablet; Take 1 tablet by mouth Every Night.  Dispense: 90 tablet; Refill: 1  -     estradiol (CLIMARA) 0.05 MG/24HR patch; Place 1 patch on the skin as directed by provider 1 (One) Time Per Week.  Dispense: 12 patch; Refill: 2  -     omeprazole (PrilOSEC) 20 MG capsule; Take 1  capsule by mouth Daily.  Dispense: 90 capsule; Refill: 1  -     fluticasone (FLONASE) 50 MCG/ACT nasal spray; 2 sprays into the nostril(s) as directed by provider Daily.  Dispense: 16 g; Refill: 3  -     Cancel: Lipid Panel; Future  -     Cancel: TSH; Future      At this point patient is stable with her irritable bowel syndrome her diarrhea and constipation are controlled.  She is unfortunately unable to use CPAP or BiPAP as she states that she cannot breathe through that arm.  In regard to her GERD is controlled with use of omeprazole.  We are doing additional lab work today to assess some of her other problems

## 2020-05-20 LAB
CHOLEST SERPL-MCNC: 197 MG/DL (ref 0–200)
HDLC SERPL-MCNC: 68 MG/DL (ref 40–60)
LDLC SERPL CALC-MCNC: 94 MG/DL (ref 0–100)
TRIGL SERPL-MCNC: 173 MG/DL (ref 0–150)
VLDLC SERPL CALC-MCNC: 34.6 MG/DL

## 2020-05-24 ENCOUNTER — RESULTS ENCOUNTER (OUTPATIENT)
Dept: INTERNAL MEDICINE | Facility: CLINIC | Age: 65
End: 2020-05-24

## 2020-05-24 DIAGNOSIS — K58.2 IRRITABLE BOWEL SYNDROME WITH BOTH CONSTIPATION AND DIARRHEA: ICD-10-CM

## 2020-05-24 DIAGNOSIS — K21.9 GASTROESOPHAGEAL REFLUX DISEASE WITHOUT ESOPHAGITIS: ICD-10-CM

## 2020-05-24 DIAGNOSIS — E78.2 MIXED HYPERLIPIDEMIA: ICD-10-CM

## 2020-05-27 ENCOUNTER — TELEPHONE (OUTPATIENT)
Dept: INTERNAL MEDICINE | Facility: CLINIC | Age: 65
End: 2020-05-27

## 2020-05-27 DIAGNOSIS — Z12.39 SCREENING FOR BREAST CANCER: Primary | ICD-10-CM

## 2020-05-27 DIAGNOSIS — Z12.31 SCREENING MAMMOGRAM, ENCOUNTER FOR: ICD-10-CM

## 2020-05-27 NOTE — TELEPHONE ENCOUNTER
Pt called in requesting to have mammogram appt set up. Please schedule with [facility].     Please call pt to notify of appt.     Thank you

## 2020-05-28 ENCOUNTER — HOSPITAL ENCOUNTER (OUTPATIENT)
Dept: MAMMOGRAPHY | Facility: HOSPITAL | Age: 65
Discharge: HOME | End: 2020-05-28
Admitting: INTERNAL MEDICINE

## 2020-05-28 DIAGNOSIS — Z12.31 SCREENING MAMMOGRAM, ENCOUNTER FOR: ICD-10-CM

## 2020-05-28 DIAGNOSIS — K58.2 IRRITABLE BOWEL SYNDROME WITH BOTH CONSTIPATION AND DIARRHEA: ICD-10-CM

## 2020-05-28 PROCEDURE — 77067 SCR MAMMO BI INCL CAD: CPT

## 2020-05-28 PROCEDURE — 77063 BREAST TOMOSYNTHESIS BI: CPT

## 2020-05-28 RX ORDER — HYOSCYAMINE SULFATE 0.125 MG
125 TABLET,DISINTEGRATING ORAL EVERY 6 HOURS PRN
Qty: 60 TABLET | Refills: 1 | Status: SHIPPED | OUTPATIENT
Start: 2020-05-28

## 2020-09-14 RX ORDER — FLUTICASONE PROPIONATE 50 MCG
2 SPRAY, SUSPENSION (ML) NASAL DAILY
Qty: 48 ML | Refills: 1 | Status: SHIPPED | OUTPATIENT
Start: 2020-09-14 | End: 2021-04-19

## 2020-09-24 ENCOUNTER — TELEPHONE (OUTPATIENT)
Dept: INTERNAL MEDICINE | Facility: CLINIC | Age: 65
End: 2020-09-24

## 2020-09-24 NOTE — TELEPHONE ENCOUNTER
Cannot order something like this without an office visit.  We have not seen her since May.  You can offer her an appointment with one of the NP's.

## 2020-09-24 NOTE — TELEPHONE ENCOUNTER
PATIENT WOULD LIKE TO HAVE A SOFT CERVICAL COLLAR ORDERED FOR HER.     PLEASE CALL BACK AND ADVISE  [phone]

## 2020-09-29 NOTE — TELEPHONE ENCOUNTER
Pt states she is going to try to wait till her next appointment but if something changes, she will call

## 2020-11-03 ENCOUNTER — OFFICE VISIT (OUTPATIENT)
Dept: INTERNAL MEDICINE | Facility: CLINIC | Age: 65
End: 2020-11-03

## 2020-11-03 VITALS
TEMPERATURE: 98.4 F | SYSTOLIC BLOOD PRESSURE: 120 MMHG | HEIGHT: 63 IN | WEIGHT: 167.8 LBS | OXYGEN SATURATION: 98 % | DIASTOLIC BLOOD PRESSURE: 74 MMHG | HEART RATE: 110 BPM | BODY MASS INDEX: 29.73 KG/M2

## 2020-11-03 DIAGNOSIS — E78.2 MIXED HYPERLIPIDEMIA: Primary | ICD-10-CM

## 2020-11-03 DIAGNOSIS — M50.90 CERVICAL DISC DISEASE: ICD-10-CM

## 2020-11-03 DIAGNOSIS — G47.33 OBSTRUCTIVE SLEEP APNEA: ICD-10-CM

## 2020-11-03 PROCEDURE — 99214 OFFICE O/P EST MOD 30 MIN: CPT | Performed by: INTERNAL MEDICINE

## 2020-11-03 RX ORDER — OMEPRAZOLE 20 MG/1
20 CAPSULE, DELAYED RELEASE ORAL DAILY
Qty: 90 CAPSULE | Refills: 3 | Status: SHIPPED | OUTPATIENT
Start: 2020-11-03 | End: 2021-10-06

## 2020-11-03 NOTE — PROGRESS NOTES
Pati Mcqueen is a 65 y.o. female.   Chief Complaint   Patient presents with   • Hyperlipidemia     6 Month Follow Up   • Sleep Apnea       This is a follow-up visit for patient with obstructive sleep apnea who is unable to use a CPAP or BiPAP device.  She uses a cervical collar to try and position her chin and apparently that is been somewhat successful.  She also use it because she has cervical disc disease and neck pain.       The following portions of the patient's history were reviewed and updated as appropriate: allergies, current medications, past family history, past medical history, past social history, past surgical history and problem list.    Review of Systems   Constitutional: Negative for activity change, appetite change, fatigue, fever, unexpected weight gain and unexpected weight loss.   HENT: Negative for swollen glands, trouble swallowing and voice change.    Eyes: Negative for blurred vision and visual disturbance.   Respiratory: Negative for cough and shortness of breath.    Cardiovascular: Negative for chest pain, palpitations and leg swelling.   Gastrointestinal: Negative for abdominal pain, constipation, diarrhea, nausea, vomiting and indigestion.   Endocrine: Negative for cold intolerance, heat intolerance, polydipsia and polyphagia.   Genitourinary: Negative for dysuria and frequency.   Musculoskeletal: Positive for neck pain. Negative for arthralgias, back pain and joint swelling.   Skin: Negative for color change, rash and skin lesions.   Neurological: Negative for dizziness, weakness, headache, memory problem and confusion.   Hematological: Does not bruise/bleed easily.   Psychiatric/Behavioral: Negative for agitation, hallucinations and suicidal ideas. The patient is not nervous/anxious.        Objective   Past Medical History:   Diagnosis Date   • Chronic back pain    • Chronic neck pain    • Claustrophobia    • Colon polyp    • DDD (degenerative disc disease), lumbar     • GERD (gastroesophageal reflux disease)    • Hyperlipidemia    • Hyperlipidemia    • IBS (irritable bowel syndrome)    • Low back pain    • Peptic ulcer    • Sleep apnea       Past Surgical History:   Procedure Laterality Date   • BACK SURGERY     • BLADDER SUSPENSION     • CATARACT EXTRACTION Bilateral    • COLONOSCOPY  04/16/2015   • COLONOSCOPY N/A 5/31/2018    Procedure: COLONOSCOPY WITH ANESTHESIA;  Surgeon: Jed Lutz MD;  Location: [facility] ENDOSCOPY;  Service: Gastroenterology   • ENDOSCOPY  02/17/2004   • HYSTERECTOMY          Current Outpatient Medications:   •  Calcium & Magnesium Carbonates (MYLANTA PO), Take 1 tablet by mouth As Needed., Disp: , Rfl:   •  estradiol (CLIMARA) 0.05 MG/24HR patch, Place 1 patch on the skin as directed by provider 1 (One) Time Per Week., Disp: 12 patch, Rfl: 2  •  fluticasone (FLONASE) 50 MCG/ACT nasal spray, 2 SPRAYS INTO THE NOSTRIL(S) AS DIRECTED BY PROVIDER DAILY., Disp: 48 mL, Rfl: 1  •  GuaiFENesin (MUCINEX PO), Take 1 tablet by mouth 2 (Two) Times a Day., Disp: , Rfl:   •  hyoscyamine sulfate (ANASPAZ) 0.125 MG tablet dispersible disintegrating tablet, PLACE 1 TABLET ON THE TONGUE EVERY 6 (SIX) HOURS AS NEEDED (FOR IBS)., Disp: 60 tablet, Rfl: 1  •  Loratadine (CLARITIN PO), Take 1 tablet by mouth As Needed., Disp: , Rfl:   •  LORazepam (ATIVAN) 0.5 MG tablet, As Needed., Disp: , Rfl:   •  omeprazole (PrilOSEC) 20 MG capsule, Take 1 capsule by mouth Daily., Disp: 90 capsule, Rfl: 3  •  oxyCODONE-acetaminophen (PERCOCET) 7.5-325 MG per tablet, Take 1 tablet by mouth 4 (Four) Times a Day., Disp: , Rfl:   •  simvastatin (ZOCOR) 40 MG tablet, Take 1 tablet by mouth Every Night., Disp: 90 tablet, Rfl: 1     Vitals:    11/03/20 1421   BP: 120/74   Pulse: 110   Temp: 98.4 °F (36.9 °C)   SpO2: 98%         11/03/20  1421   Weight: 76.1 kg (167 lb 12.8 oz)     Patient's Body mass index is 29.72 kg/m². BMI is .      Physical Exam  Constitutional:       Appearance:  Normal appearance. She is well-developed.   HENT:      Head: Normocephalic and atraumatic.      Right Ear: External ear normal.      Left Ear: External ear normal.   Eyes:      Extraocular Movements: Extraocular movements intact.      Conjunctiva/sclera: Conjunctivae normal.      Pupils: Pupils are equal, round, and reactive to light.   Neck:      Musculoskeletal: Normal range of motion and neck supple. No neck rigidity.      Vascular: No carotid bruit.   Cardiovascular:      Rate and Rhythm: Normal rate and regular rhythm.      Pulses: Normal pulses.      Heart sounds: Normal heart sounds. No murmur. No gallop.    Pulmonary:      Effort: Pulmonary effort is normal.      Breath sounds: Normal breath sounds.   Abdominal:      General: Bowel sounds are normal.      Palpations: Abdomen is soft.   Musculoskeletal: Normal range of motion.         General: No swelling or tenderness.   Skin:     General: Skin is warm and dry.   Neurological:      General: No focal deficit present.      Mental Status: She is alert and oriented to person, place, and time.   Psychiatric:         Mood and Affect: Mood normal.         Behavior: Behavior normal.               Assessment/Plan   Diagnoses and all orders for this visit:    1. Mixed hyperlipidemia (Primary)  -     ALT  -     AST  -     Lipid Panel    2. Obstructive sleep apnea  -     Miscellaneous DME    3. Cervical disc disease  -     Miscellaneous DME    Other orders  -     omeprazole (PrilOSEC) 20 MG capsule; Take 1 capsule by mouth Daily.  Dispense: 90 capsule; Refill: 3    Patient has significant cervical disc disease and sleep apnea she is aided by the use of a cervical collar which she sleeps in at night.  She is requesting a refill on her omeprazole due to her GERD.

## 2020-11-04 LAB
ALT SERPL-CCNC: 15 U/L (ref 1–33)
AST SERPL-CCNC: 14 U/L (ref 1–32)
CHOLEST SERPL-MCNC: 183 MG/DL (ref 0–200)
HDLC SERPL-MCNC: 69 MG/DL (ref 40–60)
LDLC SERPL CALC-MCNC: 94 MG/DL (ref 0–100)
TRIGL SERPL-MCNC: 113 MG/DL (ref 0–150)
VLDLC SERPL CALC-MCNC: 20 MG/DL (ref 5–40)

## 2020-11-16 ENCOUNTER — TELEPHONE (OUTPATIENT)
Dept: INTERNAL MEDICINE | Facility: CLINIC | Age: 65
End: 2020-11-16

## 2020-11-16 RX ORDER — CEFDINIR 300 MG/1
300 CAPSULE ORAL 2 TIMES DAILY
Qty: 20 CAPSULE | Refills: 0 | Status: SHIPPED | OUTPATIENT
Start: 2020-11-16 | End: 2021-02-12

## 2020-11-16 RX ORDER — METHYLPREDNISOLONE 4 MG/1
TABLET ORAL
Qty: 21 TABLET | Refills: 0 | Status: SHIPPED | OUTPATIENT
Start: 2020-11-16 | End: 2021-02-12

## 2020-11-16 NOTE — TELEPHONE ENCOUNTER
Patient called and advised that she is experiencing a bad sinus infection. patient has pain around cheeks, Patient wanted to request to have Rx sent to pharmacy.     Please contact patient and advise @ [phone]

## 2020-11-16 NOTE — TELEPHONE ENCOUNTER
Dr. Philip reviewed and will give Cefdinir and MDP.  Pt informed and will send to [facility] at her request.

## 2020-12-17 RX ORDER — SIMVASTATIN 40 MG
TABLET ORAL
Qty: 90 TABLET | Refills: 1 | Status: SHIPPED | OUTPATIENT
Start: 2020-12-17 | End: 2022-08-03

## 2021-01-18 RX ORDER — AZELASTINE HYDROCHLORIDE 137 UG/1
2 SPRAY, METERED NASAL 2 TIMES DAILY
Qty: 1 BOTTLE | Refills: 5 | Status: SHIPPED | OUTPATIENT
Start: 2021-01-18 | End: 2021-11-29

## 2021-01-19 RX ORDER — ESTRADIOL 0.05 MG/D
1 PATCH TRANSDERMAL WEEKLY
Qty: 12 PATCH | Refills: 3 | Status: SHIPPED | OUTPATIENT
Start: 2021-01-19 | End: 2021-12-15

## 2021-01-25 ENCOUNTER — OFFICE VISIT (OUTPATIENT)
Dept: INTERNAL MEDICINE | Facility: CLINIC | Age: 66
End: 2021-01-25

## 2021-01-25 VITALS
SYSTOLIC BLOOD PRESSURE: 132 MMHG | DIASTOLIC BLOOD PRESSURE: 80 MMHG | HEIGHT: 63 IN | OXYGEN SATURATION: 96 % | BODY MASS INDEX: 29.59 KG/M2 | WEIGHT: 167 LBS | HEART RATE: 102 BPM | TEMPERATURE: 98.2 F

## 2021-01-25 DIAGNOSIS — L02.214 CUTANEOUS ABSCESS OF GROIN: ICD-10-CM

## 2021-01-25 DIAGNOSIS — L02.224 BOIL OF GROIN: Primary | ICD-10-CM

## 2021-01-25 PROCEDURE — 99212 OFFICE O/P EST SF 10 MIN: CPT | Performed by: NURSE PRACTITIONER

## 2021-01-25 RX ORDER — SULFAMETHOXAZOLE AND TRIMETHOPRIM 800; 160 MG/1; MG/1
1 TABLET ORAL 2 TIMES DAILY
Qty: 20 TABLET | Refills: 0 | Status: SHIPPED | OUTPATIENT
Start: 2021-01-25 | End: 2021-02-12

## 2021-01-25 NOTE — PROGRESS NOTES
"Chief Complaint  Abscess (left groin/rear)    Subjective          Joi Mcqueen presents to [facility] for   Mrs. Mcqueen is a pleasant 64 yo female who present acutely to the office related to boil on left side of groin/perineum. Patient reports improvement with soap/water/warm compresses last week, but noticed it increased sensitivity and size over the last couple of days. She has expressed some creamy exudate from the site. She has many drug allergy, but has tolerated bactrim in the past.     Abscess  This is a new problem. The current episode started 1 to 4 weeks ago. The problem has been gradually worsening. Pertinent negatives include no arthralgias, chills, coughing, fatigue or fever. Associated symptoms comments: Localized swelling and hurting to site on left side. Exacerbated by: sitting, palpation. Treatments tried: warm compression, soap/water, antibiotic cream. The treatment provided mild relief.       Objective   Vital Signs:   /80   Pulse 102   Temp 98.2 °F (36.8 °C)   Ht 160 cm (63\")   Wt 75.8 kg (167 lb)   SpO2 96%   BMI 29.58 kg/m²     Physical Exam  Lymphadenopathy:      Lower Body: No right inguinal adenopathy. No left inguinal adenopathy.   Skin:     General: Skin is warm.      Capillary Refill: Capillary refill takes less than 2 seconds.      Findings: Abscess, bruising, erythema and wound present. No rash.                        Assessment and Plan    Problem List Items Addressed This Visit     None      Visit Diagnoses     Boil of groin    -  Primary    Relevant Medications    sulfamethoxazole-trimethoprim (Bactrim DS) 800-160 MG per tablet    Cutaneous abscess of groin        Relevant Medications    sulfamethoxazole-trimethoprim (Bactrim DS) 800-160 MG per tablet        I spent 15 minutes caring for Joi on this date of service. This time includes time spent by me in the following activities:preparing for the visit, obtaining and/or reviewing " a separately obtained history, performing a medically appropriate examination and/or evaluation , ordering medications, tests, or procedures and documenting information in the medical record  Follow Up   Return in about 1 week (around 2/1/2021) for Recheck groin abscess.      During exam, was able to palpate and remove some exudate from site. Cleaned site with alcohol, sterile saline and peroxide. Placed dressing of ADB pads folded in half and applied paper tape to secure dressing. Advised patient to wash site with Hibiclens soap every other day, apply antibiotic ointment day. Keep site clean and dry. Start on Bactrim. Will follow up in 1 week to reassess wound and determine if irrigation and debridement is warranted. Abscess was too big and painful for procedure today.   Patient was given instructions and counseling regarding her condition or for health maintenance advice. Please see specific information pulled into the AVS if appropriate.

## 2021-02-12 ENCOUNTER — OFFICE VISIT (OUTPATIENT)
Dept: INTERNAL MEDICINE | Facility: CLINIC | Age: 66
End: 2021-02-12

## 2021-02-12 VITALS
WEIGHT: 168 LBS | HEIGHT: 63 IN | OXYGEN SATURATION: 95 % | DIASTOLIC BLOOD PRESSURE: 84 MMHG | TEMPERATURE: 96.9 F | BODY MASS INDEX: 29.77 KG/M2 | SYSTOLIC BLOOD PRESSURE: 132 MMHG | HEART RATE: 85 BPM

## 2021-02-12 DIAGNOSIS — L02.214 CUTANEOUS ABSCESS OF GROIN: Primary | ICD-10-CM

## 2021-02-12 PROCEDURE — 99212 OFFICE O/P EST SF 10 MIN: CPT | Performed by: NURSE PRACTITIONER

## 2021-02-12 NOTE — PROGRESS NOTES
Chief Complaint  Abscess (Left groin. States doing alot better)    Subjective     {Problem List  Visit Diagnosis   Encounters  Notes  Medications  Labs  Result Review Imaging  Media :23}     Jio Mcqueen presents to [facility]  Mrs. Mcqueen presents to the office for wound check. 100% resolution of abscess with antibiotic, steroid, and antibacterial washes daily. She reports no worsening symptoms and inflammation resolved and denies pain.     Abscess  This is a new problem. The current episode started 1 to 4 weeks ago. The problem has been resolved. Pertinent negatives include no abdominal pain, arthralgias, chest pain, chills, congestion, fatigue, fever, headaches, joint swelling, myalgias, nausea, neck pain, rash, vomiting or weakness. Nothing aggravates the symptoms. Treatments tried: antibiotic/steroid. The treatment provided significant relief.       Review of Systems   Constitutional: Negative for activity change, appetite change, chills, fatigue and fever.   HENT: Negative for congestion, ear discharge, ear pain, facial swelling, rhinorrhea, sinus pressure, sneezing, tinnitus and trouble swallowing.    Eyes: Negative for discharge and visual disturbance.   Respiratory: Negative for chest tightness, shortness of breath, wheezing and stridor.    Cardiovascular: Negative for chest pain, palpitations and leg swelling.   Gastrointestinal: Negative for abdominal distention, abdominal pain, constipation, diarrhea, nausea and vomiting.   Endocrine: Negative for cold intolerance and heat intolerance.   Genitourinary: Negative for difficulty urinating, flank pain, frequency, hematuria and urgency.   Musculoskeletal: Negative for arthralgias, joint swelling, myalgias and neck pain.   Skin: Positive for bruise. Negative for color change and rash.   Allergic/Immunologic: Negative for environmental allergies.   Neurological: Negative for dizziness, tremors, weakness and  "confusion.   Hematological: Negative for adenopathy.   Psychiatric/Behavioral: Negative for decreased concentration and sleep disturbance. The patient is not nervous/anxious.      Objective   Vital Signs:   /84 (BP Location: Left arm)   Pulse 85   Temp 96.9 °F (36.1 °C)   Ht 160 cm (63\")   Wt 76.2 kg (168 lb)   SpO2 95%   BMI 29.76 kg/m²     Physical Exam  Constitutional:       Appearance: Normal appearance. She is well-developed and overweight.   HENT:      Head: Normocephalic.      Right Ear: Hearing and external ear normal.      Left Ear: Hearing and external ear normal.   Eyes:      General: Lids are normal. Lids are everted, no foreign bodies appreciated.   Neck:      Musculoskeletal: Full passive range of motion without pain and normal range of motion.   Cardiovascular:      Rate and Rhythm: Normal rate.      Pulses: Normal pulses.      Heart sounds: Normal heart sounds.   Pulmonary:      Effort: Pulmonary effort is normal.      Breath sounds: Normal breath sounds.   Genitourinary:      Lymphadenopathy:      Lower Body: No right inguinal adenopathy. No left inguinal adenopathy.   Skin:     General: Skin is warm and dry.      Capillary Refill: Capillary refill takes less than 2 seconds.      Findings: Wound present. No abrasion, abscess, bruising, erythema, signs of injury, laceration, lesion or rash.   Neurological:      Mental Status: She is alert.   Psychiatric:         Behavior: Behavior is cooperative.        Result Review :     Common labs    Common Labsle 5/19/20 11/3/20 11/3/20 11/3/20     1414 1414 1414   AST (SGOT)   14    ALT (SGPT)  15     Total Cholesterol 197   183   Triglycerides 173 (A)   113   HDL Cholesterol 68 (A)   69 (A)   LDL Cholesterol  94   94   (A) Abnormal value                   Assessment and Plan    Diagnoses and all orders for this visit:    1. Cutaneous abscess of groin (Primary)  Comments:  resolution      I spent 10 minutes caring for Joi on this date of service. " This time includes time spent by me in the following activities:preparing for the visit, reviewing tests, obtaining and/or reviewing a separately obtained history, performing a medically appropriate examination and/or evaluation , counseling and educating the patient/family/caregiver, documenting information in the medical record and independently interpreting results and communicating that information with the patient/family/caregiver  Follow Up   Return if symptoms worsen or fail to improve.      Continue to monitor wound and call the office with worsening of site again for possible 1&D of site. Not indicated at this time, complete resolution with hibiclen washes and antibiotic therapy.     Patient was given instructions and counseling regarding her condition or for health maintenance advice. Please see specific information pulled into the AVS if appropriate.

## 2021-04-19 RX ORDER — FLUTICASONE PROPIONATE 50 MCG
2 SPRAY, SUSPENSION (ML) NASAL DAILY
Qty: 48 ML | Refills: 3 | Status: SHIPPED | OUTPATIENT
Start: 2021-04-19 | End: 2022-07-11 | Stop reason: SDUPTHER

## 2021-05-04 ENCOUNTER — TELEPHONE (OUTPATIENT)
Dept: INTERNAL MEDICINE | Facility: CLINIC | Age: 66
End: 2021-05-04

## 2021-05-04 NOTE — TELEPHONE ENCOUNTER
Caller: Joi Mcqueen A    Relationship: Self    Best call back number: [phone]    SINUS ARE CLOGGED, NOSE DRIPPING     How long have you been experiencing symptoms: LAST MARCH     Have you had these symptoms before:    [x] Yes  [] No    Have you been treated for these symptoms before:   [x] Yes  [] No    If a prescription is needed, what is your preferred pharmacy and phone number:  [facility]/pharmacy #[id number] - [place], [place] - [address] AT ACROSS FROM [address] - [phone]  - [phone]   [phone]    Additional notes:

## 2021-05-05 ENCOUNTER — OFFICE VISIT (OUTPATIENT)
Dept: INTERNAL MEDICINE | Facility: CLINIC | Age: 66
End: 2021-05-05

## 2021-05-05 VITALS — WEIGHT: 168 LBS | HEIGHT: 63 IN | BODY MASS INDEX: 29.77 KG/M2

## 2021-05-05 DIAGNOSIS — J01.41 ACUTE RECURRENT PANSINUSITIS: Primary | ICD-10-CM

## 2021-05-05 PROCEDURE — 99442 PR PHYS/QHP TELEPHONE EVALUATION 11-20 MIN: CPT | Performed by: NURSE PRACTITIONER

## 2021-05-05 RX ORDER — AMOXICILLIN AND CLAVULANATE POTASSIUM 875; 125 MG/1; MG/1
1 TABLET, FILM COATED ORAL 2 TIMES DAILY
Qty: 20 TABLET | Refills: 0 | Status: SHIPPED | OUTPATIENT
Start: 2021-05-05 | End: 2021-05-15

## 2021-05-05 NOTE — PROGRESS NOTES
Subjective   Joi Mcqueen is a 66 y.o. female.   Chief Complaint   Patient presents with   • Sinusitis     Sinus pressure-cheeks, across nose and eyes. Post nasal drip and is doing nasal irrigation bid       Ms. Mcqueen is a pleasant 66-year-old female who presents via telephone visit related to persistent, recurrent sinusitis infection symptoms.  She reports symptoms started a meeting earlier and have progressively gotten worse over the last 2 or 3 days.  She reports trying numerous over-the-counter therapies and prescription therapies.  She has been doing nasal lavage twice a day, Claritin at night, Flonase, and Mucinex.  She is also increased her hydration.  She states she is now having moderate tenderness and her sinuses and it is starting to drain to the back of her throat keeping her up with a mild cough.  She denies fever, chills, malaise, diarrhea, change in taste or smell, or sick contacts with anyone positive for Covid in the last 2 weeks.       The following portions of the patient's history were reviewed and updated as appropriate: allergies, current medications, past family history, past medical history, past social history, past surgical history and problem list.    Review of Systems   Constitutional: Negative for activity change, appetite change, fatigue, fever, unexpected weight gain and unexpected weight loss.   HENT: Positive for congestion, postnasal drip, rhinorrhea, sinus pressure and sore throat. Negative for swollen glands, trouble swallowing and voice change.    Eyes: Negative for blurred vision and visual disturbance.   Respiratory: Negative for cough, chest tightness and shortness of breath.    Cardiovascular: Negative for chest pain, palpitations and leg swelling.   Gastrointestinal: Negative for abdominal pain, constipation, diarrhea, nausea, vomiting and indigestion.   Endocrine: Negative for cold intolerance, heat intolerance, polydipsia and polyphagia.   Genitourinary: Negative  for dysuria and frequency.   Musculoskeletal: Negative for arthralgias, back pain, joint swelling and neck pain.   Skin: Negative for color change, rash and skin lesions.   Neurological: Negative for dizziness, weakness, headache, memory problem and confusion.   Hematological: Does not bruise/bleed easily.   Psychiatric/Behavioral: Negative for agitation, hallucinations and suicidal ideas. The patient is not nervous/anxious.        Objective   Past Medical History:   Diagnosis Date   • Chronic back pain    • Chronic neck pain    • Claustrophobia    • Colon polyp    • DDD (degenerative disc disease), lumbar    • GERD (gastroesophageal reflux disease)    • Hyperlipidemia    • Hyperlipidemia    • IBS (irritable bowel syndrome)    • Low back pain    • Peptic ulcer    • Sleep apnea       Past Surgical History:   Procedure Laterality Date   • BACK SURGERY     • BLADDER SUSPENSION     • CATARACT EXTRACTION Bilateral    • COLONOSCOPY  04/16/2015   • COLONOSCOPY N/A 5/31/2018    Procedure: COLONOSCOPY WITH ANESTHESIA;  Surgeon: Jed Lutz MD;  Location: [facility] ENDOSCOPY;  Service: Gastroenterology   • ENDOSCOPY  02/17/2004   • HYSTERECTOMY          Current Outpatient Medications:   •  Calcium & Magnesium Carbonates (MYLANTA PO), Take 1 tablet by mouth As Needed., Disp: , Rfl:   •  estradiol (CLIMARA) 0.05 MG/24HR patch, PLACE 1 PATCH ON THE SKIN AS DIRECTED BY PROVIDER 1 (ONE) TIME PER WEEK., Disp: 12 patch, Rfl: 3  •  fluticasone (FLONASE) 50 MCG/ACT nasal spray, 2 SPRAYS INTO THE NOSTRIL(S) AS DIRECTED BY PROVIDER DAILY., Disp: 48 mL, Rfl: 3  •  GuaiFENesin (MUCINEX PO), Take 1 tablet by mouth 2 (Two) Times a Day., Disp: , Rfl:   •  hyoscyamine sulfate (ANASPAZ) 0.125 MG tablet dispersible disintegrating tablet, PLACE 1 TABLET ON THE TONGUE EVERY 6 (SIX) HOURS AS NEEDED (FOR IBS)., Disp: 60 tablet, Rfl: 1  •  Loratadine (CLARITIN PO), Take 1 tablet by mouth As Needed., Disp: , Rfl:   •  LORazepam (ATIVAN) 0.5 MG  tablet, As Needed., Disp: , Rfl:   •  omeprazole (PrilOSEC) 20 MG capsule, Take 1 capsule by mouth Daily., Disp: 90 capsule, Rfl: 3  •  oxyCODONE-acetaminophen (PERCOCET) 7.5-325 MG per tablet, Take 1 tablet by mouth 4 (Four) Times a Day., Disp: , Rfl:   •  simvastatin (ZOCOR) 40 MG tablet, TAKE 1 TABLET BY MOUTH EVERY DAY AT NIGHT (Patient taking differently: Every Other Day.), Disp: 90 tablet, Rfl: 1  •  amoxicillin-clavulanate (Augmentin) 875-125 MG per tablet, Take 1 tablet by mouth 2 (Two) Times a Day for 10 days., Disp: 20 tablet, Rfl: 0  •  Azelastine HCl 137 MCG/SPRAY solution, 2 sprays into the nostril(s) as directed by provider 2 (two) times a day., Disp: 1 bottle, Rfl: 5     There were no vitals filed for this visit.      05/05/21  1501   Weight: 76.2 kg (168 lb)       Assessment/Plan   Diagnoses and all orders for this visit:    1. Acute recurrent pansinusitis (Primary)  -     amoxicillin-clavulanate (Augmentin) 875-125 MG per tablet; Take 1 tablet by mouth 2 (Two) Times a Day for 10 days.  Dispense: 20 tablet; Refill: 0      You have chosen to receive care through a telephone visit. Do you consent to use a telephone visit for your medical care today? Yes    This visit has been rescheduled as a phone visit to comply with patient safety concerns in accordance with CDC recommendations. Total time of discussion was 11 minutes.    Patient reports no allergy to penicillins has tolerated Augmentin 3 or 4 times in the past 10 years for recurrent sinusitis issues.  We will start her on Augmentin today and advised her to call the office in 1 week if symptoms do not improve for further treatment needs.  We will have her continue to nasal lavage 2-3 times a day, Flonase every morning 2 sprays each nostril, Claritin at night, and Mucinex for the next 2 weeks.

## 2021-05-18 ENCOUNTER — TELEPHONE (OUTPATIENT)
Dept: INTERNAL MEDICINE | Facility: CLINIC | Age: 66
End: 2021-05-18

## 2021-05-18 LAB
ALBUMIN SERPL-MCNC: 4.3 G/DL (ref 3.5–5.2)
ALBUMIN/GLOB SERPL: 1.9 G/DL
ALP SERPL-CCNC: 53 U/L (ref 39–117)
ALT SERPL-CCNC: 14 U/L (ref 1–33)
APPEARANCE UR: CLEAR
AST SERPL-CCNC: 15 U/L (ref 1–32)
BACTERIA #/AREA URNS HPF: NORMAL /HPF
BASOPHILS # BLD AUTO: 0.02 10*3/MM3 (ref 0–0.2)
BASOPHILS NFR BLD AUTO: 0.3 % (ref 0–1.5)
BILIRUB SERPL-MCNC: 0.5 MG/DL (ref 0–1.2)
BILIRUB UR QL STRIP: NEGATIVE
BUN SERPL-MCNC: 12 MG/DL (ref 8–23)
BUN/CREAT SERPL: 14.8 (ref 7–25)
CALCIUM SERPL-MCNC: 9.2 MG/DL (ref 8.6–10.5)
CASTS URNS MICRO: NORMAL
CHLORIDE SERPL-SCNC: 104 MMOL/L (ref 98–107)
CO2 SERPL-SCNC: 27.2 MMOL/L (ref 22–29)
COLOR UR: YELLOW
CREAT SERPL-MCNC: 0.81 MG/DL (ref 0.57–1)
EOSINOPHIL # BLD AUTO: 0.08 10*3/MM3 (ref 0–0.4)
EOSINOPHIL NFR BLD AUTO: 1.3 % (ref 0.3–6.2)
EPI CELLS #/AREA URNS HPF: NORMAL /HPF
ERYTHROCYTE [DISTWIDTH] IN BLOOD BY AUTOMATED COUNT: 12.2 % (ref 12.3–15.4)
GLOBULIN SER CALC-MCNC: 2.3 GM/DL
GLUCOSE SERPL-MCNC: 103 MG/DL (ref 65–99)
GLUCOSE UR QL: NEGATIVE
HCT VFR BLD AUTO: 40.9 % (ref 34–46.6)
HGB BLD-MCNC: 13.3 G/DL (ref 12–15.9)
HGB UR QL STRIP: NEGATIVE
IMM GRANULOCYTES # BLD AUTO: 0.02 10*3/MM3 (ref 0–0.05)
IMM GRANULOCYTES NFR BLD AUTO: 0.3 % (ref 0–0.5)
KETONES UR QL STRIP: NEGATIVE
LEUKOCYTE ESTERASE UR QL STRIP: NEGATIVE
LYMPHOCYTES # BLD AUTO: 1.82 10*3/MM3 (ref 0.7–3.1)
LYMPHOCYTES NFR BLD AUTO: 30 % (ref 19.6–45.3)
MCH RBC QN AUTO: 30.4 PG (ref 26.6–33)
MCHC RBC AUTO-ENTMCNC: 32.5 G/DL (ref 31.5–35.7)
MCV RBC AUTO: 93.4 FL (ref 79–97)
MONOCYTES # BLD AUTO: 0.45 10*3/MM3 (ref 0.1–0.9)
MONOCYTES NFR BLD AUTO: 7.4 % (ref 5–12)
NEUTROPHILS # BLD AUTO: 3.67 10*3/MM3 (ref 1.7–7)
NEUTROPHILS NFR BLD AUTO: 60.7 % (ref 42.7–76)
NITRITE UR QL STRIP: NEGATIVE
NRBC BLD AUTO-RTO: 0 /100 WBC (ref 0–0.2)
PH UR STRIP: 5.5 [PH] (ref 5–8)
PLATELET # BLD AUTO: 227 10*3/MM3 (ref 140–450)
POTASSIUM SERPL-SCNC: 4.3 MMOL/L (ref 3.5–5.2)
PROT SERPL-MCNC: 6.6 G/DL (ref 6–8.5)
PROT UR QL STRIP: NEGATIVE
RBC # BLD AUTO: 4.38 10*6/MM3 (ref 3.77–5.28)
RBC #/AREA URNS HPF: NORMAL /HPF
SODIUM SERPL-SCNC: 138 MMOL/L (ref 136–145)
SP GR UR: 1.02 (ref 1–1.03)
TSH SERPL DL<=0.005 MIU/L-ACNC: 0.91 UIU/ML (ref 0.27–4.2)
URATE SERPL-MCNC: 4.5 MG/DL (ref 2.4–5.7)
UROBILINOGEN UR STRIP-MCNC: NORMAL MG/DL
WBC # BLD AUTO: 6.06 10*3/MM3 (ref 3.4–10.8)
WBC #/AREA URNS HPF: NORMAL /HPF

## 2021-05-20 ENCOUNTER — OFFICE VISIT (OUTPATIENT)
Dept: INTERNAL MEDICINE | Facility: CLINIC | Age: 66
End: 2021-05-20

## 2021-05-20 VITALS
SYSTOLIC BLOOD PRESSURE: 132 MMHG | OXYGEN SATURATION: 96 % | TEMPERATURE: 97.3 F | RESPIRATION RATE: 16 BRPM | BODY MASS INDEX: 29.1 KG/M2 | HEIGHT: 63 IN | HEART RATE: 76 BPM | DIASTOLIC BLOOD PRESSURE: 78 MMHG | WEIGHT: 164.25 LBS

## 2021-05-20 DIAGNOSIS — R73.01 IFG (IMPAIRED FASTING GLUCOSE): Primary | ICD-10-CM

## 2021-05-20 DIAGNOSIS — Z12.31 VISIT FOR SCREENING MAMMOGRAM: ICD-10-CM

## 2021-05-20 DIAGNOSIS — M89.9 DISORDER OF BONE, UNSPECIFIED: ICD-10-CM

## 2021-05-20 DIAGNOSIS — Z78.0 POSTMENOPAUSAL: ICD-10-CM

## 2021-05-20 PROBLEM — Z99.89 BIPAP (BIPHASIC POSITIVE AIRWAY PRESSURE) DEPENDENCE: Status: ACTIVE | Noted: 2021-05-20

## 2021-05-20 LAB
25(OH)D3+25(OH)D2 SERPL-MCNC: 33.5 NG/ML (ref 30–100)
HBA1C MFR BLD: 5.5 % (ref 4.8–5.6)
Lab: NORMAL
WRITTEN AUTHORIZATION: NORMAL

## 2021-05-20 PROCEDURE — G0439 PPPS, SUBSEQ VISIT: HCPCS | Performed by: INTERNAL MEDICINE

## 2021-05-20 NOTE — PROGRESS NOTES
The ABCs of the Annual Wellness Visit  Subsequent Medicare Wellness Visit    Chief Complaint   Patient presents with   • Medicare Wellness-subsequent     Pt recently lost her        Subjective   History of Present Illness:  Joi Mcqueen is a 66 y.o. female who presents for a Subsequent Medicare Wellness Visit.  Patient is not having new problems she is recently lost her  she is still using some Ativan but using it as little as possible.  She has not been Covid vaccinated this point tried to answer any question she had she states that she has problems with vaccines and does not want to pursue that option.    HEALTH RISK ASSESSMENT    Recent Hospitalizations:  No hospitalization(s) within the last year.    Current Medical Providers:  Patient Care Team:  Cameron Philip MD as PCP - General (Internal Medicine)  Jed Lutz MD as Consulting Physician (Gastroenterology)    Smoking Status:  Social History     Tobacco Use   Smoking Status Former Smoker   • Types: Cigarettes   • Quit date: 2006   • Years since quitting: 15.3   Smokeless Tobacco Never Used       Alcohol Consumption:  Social History     Substance and Sexual Activity   Alcohol Use No       Depression Screen:   PHQ-2/PHQ-9 Depression Screening 5/20/2021   Little interest or pleasure in doing things 1   Feeling down, depressed, or hopeless 1   Trouble falling or staying asleep, or sleeping too much 1   Feeling tired or having little energy 1   Poor appetite or overeating 1   Feeling bad about yourself - or that you are a failure or have let yourself or your family down 0   Trouble concentrating on things, such as reading the newspaper or watching television 0   Moving or speaking so slowly that other people could have noticed. Or the opposite - being so fidgety or restless that you have been moving around a lot more than usual 0   Thoughts that you would be better off dead, or of hurting yourself in some way 0   Total Score 5   If you  checked off any problems, how difficult have these problems made it for you to do your work, take care of things at home, or get along with other people? Somewhat difficult       Fall Risk Screen:  MANUEL Fall Risk Assessment was completed, and patient is at LOW risk for falls.Assessment completed on:5/20/2021    Health Habits and Functional and Cognitive Screening:  Functional & Cognitive Status 5/20/2021   Do you have difficulty preparing food and eating? No   Do you have difficulty bathing yourself, getting dressed or grooming yourself? No   Do you have difficulty using the toilet? No   Do you have difficulty moving around from place to place? No   Do you have trouble with steps or getting out of a bed or a chair? No   Current Diet Well Balanced Diet   Dental Exam Unknown   Eye Exam Unknown   Exercise (times per week) 4 times per week   Current Exercise Activities Include Walking   Do you need help using the phone?  No   Are you deaf or do you have serious difficulty hearing?  No   Do you need help with transportation? No   Do you need help shopping? No   Do you need help preparing meals?  No   Do you need help with housework?  No   Do you need help with laundry? No   Do you need help taking your medications? No   Do you need help managing money? No   Do you ever drive or ride in a car without wearing a seat belt? No   Have you felt unusual stress, anger or loneliness in the last month? Yes   Who do you live with? Alone   If you need help, do you have trouble finding someone available to you? No   Have you been bothered in the last four weeks by sexual problems? No   Do you have difficulty concentrating, remembering or making decisions? No         Does the patient have evidence of cognitive impairment? No    Asprin use counseling:Does not need ASA (and currently is not on it)    Age-appropriate Screening Schedule:  Refer to the list below for future screening recommendations based on patient's age, sex and/or  medical conditions. Orders for these recommended tests are listed in the plan section. The patient has been provided with a written plan.    Health Maintenance   Topic Date Due   • DXA SCAN  Never done   • TDAP/TD VACCINES (1 - Tdap) Never done   • ZOSTER VACCINE (1 of 2) Never done   • PAP SMEAR  05/30/2021   • INFLUENZA VACCINE  08/01/2021   • LIPID PANEL  05/17/2022   • MAMMOGRAM  05/28/2022          The following portions of the patient's history were reviewed and updated as appropriate: allergies, current medications, past family history, past medical history, past social history, past surgical history and problem list.    Outpatient Medications Prior to Visit   Medication Sig Dispense Refill   • Calcium & Magnesium Carbonates (MYLANTA PO) Take 1 tablet by mouth As Needed.     • estradiol (CLIMARA) 0.05 MG/24HR patch PLACE 1 PATCH ON THE SKIN AS DIRECTED BY PROVIDER 1 (ONE) TIME PER WEEK. 12 patch 3   • fluticasone (FLONASE) 50 MCG/ACT nasal spray 2 SPRAYS INTO THE NOSTRIL(S) AS DIRECTED BY PROVIDER DAILY. 48 mL 3   • GuaiFENesin (MUCINEX PO) Take 1 tablet by mouth 2 (Two) Times a Day.     • hyoscyamine sulfate (ANASPAZ) 0.125 MG tablet dispersible disintegrating tablet PLACE 1 TABLET ON THE TONGUE EVERY 6 (SIX) HOURS AS NEEDED (FOR IBS). 60 tablet 1   • Loratadine (CLARITIN PO) Take 1 tablet by mouth As Needed.     • LORazepam (ATIVAN) 0.5 MG tablet As Needed.     • omeprazole (PrilOSEC) 20 MG capsule Take 1 capsule by mouth Daily. 90 capsule 3   • oxyCODONE-acetaminophen (PERCOCET) 7.5-325 MG per tablet Take 1 tablet by mouth 4 (Four) Times a Day.     • simvastatin (ZOCOR) 40 MG tablet TAKE 1 TABLET BY MOUTH EVERY DAY AT NIGHT (Patient taking differently: Every Other Day.) 90 tablet 1   • Azelastine HCl 137 MCG/SPRAY solution 2 sprays into the nostril(s) as directed by provider 2 (two) times a day. 1 bottle 5     No facility-administered medications prior to visit.       Patient Active Problem List    Diagnosis   • DDD (degenerative disc disease), lumbar   • Chronic midline low back pain with bilateral sciatica   • Lumbar radiculopathy   • Over weight   • Chronic constipation   • Hx of colonic polyp   • Difficulty using biphasic positive airway pressure (BiPAP) machine   • Obstructive sleep apnea   • Other insomnia   • Witnessed episode of apnea   • Irritable bowel syndrome with both constipation and diarrhea   • GERD (gastroesophageal reflux disease)   • Hyperlipidemia   • Cervical disc disease   • BiPAP (biphasic positive airway pressure) dependence       Advanced Care Planning:  ACP discussion was held with the patient during this visit. Patient does not have an advance directive, information provided.    Review of Systems   Constitutional: Negative for activity change, appetite change and chills.   HENT: Negative for congestion, ear pain and facial swelling.    Eyes: Negative for pain, discharge and itching.   Respiratory: Negative for apnea, cough and shortness of breath.    Cardiovascular: Negative for chest pain, palpitations and leg swelling.   Gastrointestinal: Negative for abdominal distention, abdominal pain and anal bleeding.   Endocrine: Negative for cold intolerance and heat intolerance.   Genitourinary: Negative for difficulty urinating, dysuria and flank pain.   Musculoskeletal: Negative for arthralgias, back pain and joint swelling.   Skin: Negative for color change, pallor and rash.   Allergic/Immunologic: Negative for environmental allergies and food allergies.   Neurological: Negative for dizziness and facial asymmetry.   Hematological: Negative for adenopathy. Does not bruise/bleed easily.   Psychiatric/Behavioral: Negative for agitation, behavioral problems and confusion.       Compared to one year ago, the patient feels her physical health is the same.  Compared to one year ago, the patient feels her mental health is the same.    Reviewed chart for potential of high risk medication in the  "elderly: yes  Reviewed chart for potential of harmful drug interactions in the elderly:yes    Objective         Vitals:    05/20/21 0802   BP: 132/78   BP Location: Left arm   Patient Position: Sitting   Cuff Size: Adult   Pulse: 76   Resp: 16   Temp: 97.3 °F (36.3 °C)   TempSrc: Temporal   SpO2: 96%   Weight: 74.5 kg (164 lb 4 oz)   Height: 160 cm (63\")   PainSc: 0-No pain       Body mass index is 29.1 kg/m².  Discussed the patient's BMI with her. The BMI is above average; BMI management plan is completed.    Physical Exam  Constitutional:       Appearance: Normal appearance. She is well-developed.   HENT:      Head: Normocephalic and atraumatic.      Right Ear: External ear normal.      Left Ear: External ear normal.   Eyes:      Extraocular Movements: Extraocular movements intact.      Conjunctiva/sclera: Conjunctivae normal.      Pupils: Pupils are equal, round, and reactive to light.   Neck:      Vascular: No carotid bruit.   Cardiovascular:      Rate and Rhythm: Normal rate and regular rhythm.      Pulses: Normal pulses.      Heart sounds: Normal heart sounds. No murmur heard.   No gallop.    Pulmonary:      Effort: Pulmonary effort is normal.      Breath sounds: Normal breath sounds.   Musculoskeletal:         General: No swelling or tenderness. Normal range of motion.      Cervical back: Normal range of motion and neck supple. No rigidity.   Skin:     General: Skin is warm and dry.   Neurological:      General: No focal deficit present.      Mental Status: She is alert and oriented to person, place, and time.   Psychiatric:         Mood and Affect: Mood normal.         Behavior: Behavior normal.         Lab Results   Component Value Date     (H) 05/17/2021    CHLPL 164 05/17/2021    TRIG 78 05/17/2021    HDL 57 05/17/2021    LDL 92 05/17/2021    VLDL 15 05/17/2021        Assessment/Plan   Medicare Risks and Personalized Health Plan  CMS Preventative Services Quick Reference  Advance Directive " Discussion  Breast Cancer/Mammogram Screening  Colon Cancer Screening  Immunizations Discussed/Encouraged (specific immunizations; Tdap, Pneumococcal 23, Shingrix and COVID19 )  Osteoporosis Risk    The above risks/problems have been discussed with the patient.  Pertinent information has been shared with the patient in the After Visit Summary.  Follow up plans and orders are seen below in the Assessment/Plan Section.    Diagnoses and all orders for this visit:    1. IFG (impaired fasting glucose) (Primary)  -     Hemoglobin A1c    2. Postmenopausal  -     Vitamin D 25 Hydroxy    3. Visit for screening mammogram  -     Mammo Screening Digital Tomosynthesis Bilateral With CAD; Future    4. Disorder of bone, unspecified   -     Vitamin D 25 Hydroxy      Follow Up:  Return in about 6 months (around 11/20/2021).     An After Visit Summary and PPPS were given to the patient.     This point patient needs a vitamin D level as well as an A1c added to her blood work that she is already had.  The rest remainder of her blood work looked reasonably good should her breast exam was okay we are going to order some mammograms as well as bone density studies.

## 2021-05-21 ENCOUNTER — TELEPHONE (OUTPATIENT)
Dept: INTERNAL MEDICINE | Facility: CLINIC | Age: 66
End: 2021-05-21

## 2021-06-14 ENCOUNTER — HOSPITAL ENCOUNTER (OUTPATIENT)
Dept: BONE DENSITY | Facility: HOSPITAL | Age: 66
Discharge: HOME | End: 2021-06-14

## 2021-06-14 ENCOUNTER — HOSPITAL ENCOUNTER (OUTPATIENT)
Dept: MAMMOGRAPHY | Facility: HOSPITAL | Age: 66
Discharge: HOME | End: 2021-06-14

## 2021-06-14 DIAGNOSIS — Z78.0 POSTMENOPAUSAL: ICD-10-CM

## 2021-06-14 DIAGNOSIS — Z12.31 VISIT FOR SCREENING MAMMOGRAM: ICD-10-CM

## 2021-06-14 PROCEDURE — 77080 DXA BONE DENSITY AXIAL: CPT

## 2021-06-14 PROCEDURE — 77063 BREAST TOMOSYNTHESIS BI: CPT

## 2021-06-14 PROCEDURE — 77067 SCR MAMMO BI INCL CAD: CPT

## 2021-06-17 ENCOUNTER — TELEPHONE (OUTPATIENT)
Dept: INTERNAL MEDICINE | Facility: CLINIC | Age: 66
End: 2021-06-17

## 2021-06-17 NOTE — TELEPHONE ENCOUNTER
Spoke with pt gave normal results, advised pt on fosamax, but pt has some OTC calcium with vit D chews she is going to take

## 2021-09-14 ENCOUNTER — TELEMEDICINE (OUTPATIENT)
Dept: INTERNAL MEDICINE | Facility: CLINIC | Age: 66
End: 2021-09-14

## 2021-09-14 DIAGNOSIS — J01.41 ACUTE RECURRENT PANSINUSITIS: Primary | ICD-10-CM

## 2021-09-14 PROCEDURE — 99442 PR PHYS/QHP TELEPHONE EVALUATION 11-20 MIN: CPT | Performed by: NURSE PRACTITIONER

## 2021-09-14 NOTE — PROGRESS NOTES
Pati Mcqueen is a 66 y.o. female.   Chief Complaint   Patient presents with   • Sinusitis     Sinus pain over left eye, post nasal drip, congestion in head. Started 3 weeks ago       You have chosen to receive care through a telephone visit. Do you consent to use a telephone visit for your medical care today? Yes    Patient reports allergy symptoms worsening for 3 weeks and now has developed sinusitis symptoms. Patient reports that she has sinusitis 3 times a year. She reports sinus pressure, pain, colored drainage, and sinus headache. She denies ear pain, but does report ear congestion. She denies cough, shortness of breath, fatigue, fever, or chills. She denies possible COVID exposures and has been minimizing social interactions.       The following portions of the patient's history were reviewed and updated as appropriate: allergies, current medications, past family history, past medical history, past social history, past surgical history and problem list.    Review of Systems   Constitutional: Negative for activity change, appetite change, fatigue, fever, unexpected weight gain and unexpected weight loss.   HENT: Positive for congestion, postnasal drip, rhinorrhea, sinus pressure and sneezing. Negative for swollen glands, trouble swallowing and voice change.    Eyes: Negative for blurred vision and visual disturbance.   Respiratory: Negative for cough and shortness of breath.    Cardiovascular: Negative for chest pain, palpitations and leg swelling.   Gastrointestinal: Negative for abdominal pain, constipation, diarrhea, nausea, vomiting and indigestion.   Endocrine: Negative for cold intolerance, heat intolerance, polydipsia and polyphagia.   Genitourinary: Negative for dysuria and frequency.   Musculoskeletal: Negative for arthralgias, back pain, joint swelling and neck pain.   Skin: Negative for color change, rash and skin lesions.   Allergic/Immunologic: Positive for environmental  allergies.   Neurological: Negative for dizziness, weakness, headache, memory problem and confusion.   Hematological: Does not bruise/bleed easily.   Psychiatric/Behavioral: Negative for agitation, hallucinations and suicidal ideas. The patient is not nervous/anxious.        Objective   Past Medical History:   Diagnosis Date   • Chronic back pain    • Chronic neck pain    • Claustrophobia    • Colon polyp    • DDD (degenerative disc disease), lumbar    • GERD (gastroesophageal reflux disease)    • Hyperlipidemia    • Hyperlipidemia    • IBS (irritable bowel syndrome)    • Low back pain    • Peptic ulcer    • Sleep apnea       Past Surgical History:   Procedure Laterality Date   • BACK SURGERY     • BLADDER SUSPENSION     • CATARACT EXTRACTION Bilateral    • COLONOSCOPY  04/16/2015   • COLONOSCOPY N/A 5/31/2018    Procedure: COLONOSCOPY WITH ANESTHESIA;  Surgeon: Jed Lutz MD;  Location: [facility] ENDOSCOPY;  Service: Gastroenterology   • ENDOSCOPY  02/17/2004   • HYSTERECTOMY     • OOPHORECTOMY          Current Outpatient Medications:   •  Calcium & Magnesium Carbonates (MYLANTA PO), Take 1 tablet by mouth As Needed., Disp: , Rfl:   •  CBD (cannabidiol) oral oil, Take  by mouth. patch, Disp: , Rfl:   •  estradiol (CLIMARA) 0.05 MG/24HR patch, PLACE 1 PATCH ON THE SKIN AS DIRECTED BY PROVIDER 1 (ONE) TIME PER WEEK., Disp: 12 patch, Rfl: 3  •  fluticasone (FLONASE) 50 MCG/ACT nasal spray, 2 SPRAYS INTO THE NOSTRIL(S) AS DIRECTED BY PROVIDER DAILY., Disp: 48 mL, Rfl: 3  •  GuaiFENesin (MUCINEX PO), Take 1 tablet by mouth 2 (Two) Times a Day., Disp: , Rfl:   •  hyoscyamine sulfate (ANASPAZ) 0.125 MG tablet dispersible disintegrating tablet, PLACE 1 TABLET ON THE TONGUE EVERY 6 (SIX) HOURS AS NEEDED (FOR IBS)., Disp: 60 tablet, Rfl: 1  •  Loratadine (CLARITIN PO), Take 1 tablet by mouth As Needed., Disp: , Rfl:   •  LORazepam (ATIVAN) 0.5 MG tablet, As Needed., Disp: , Rfl:   •  omeprazole (PrilOSEC) 20 MG capsule,  "Take 1 capsule by mouth Daily., Disp: 90 capsule, Rfl: 3  •  oxyCODONE-acetaminophen (PERCOCET) 7.5-325 MG per tablet, Take 1 tablet by mouth 4 (Four) Times a Day., Disp: , Rfl:   •  simvastatin (ZOCOR) 40 MG tablet, TAKE 1 TABLET BY MOUTH EVERY DAY AT NIGHT (Patient taking differently: Every Other Day.), Disp: 90 tablet, Rfl: 1  •  Unable to find, 1 each 1 (One) Time. Vitamin D,C,Zinc patch, Disp: , Rfl:   •  Azelastine HCl 137 MCG/SPRAY solution, 2 sprays into the nostril(s) as directed by provider 2 (two) times a day., Disp: 1 bottle, Rfl: 5      There were no vitals filed for this visit.  There were no vitals filed for this visit.    There is no height or weight on file to calculate BMI.      Assessment/Plan   Diagnoses and all orders for this visit:    1. Acute recurrent pansinusitis (Primary)    This visit has been rescheduled as a phone visit to comply with patient safety concerns in accordance with CDC recommendations. Total time of discussion was 11 minutes.    Patient will start on Augmentin. She has 2 episodes of sinusitis every year and has been to allergy medicine to manage her symptoms most of the year. She has been to allergy specialist and reported \" allergic to everything\". Will continue current allergy medicine, Mucinex Dm, and increase hydration.            "

## 2021-09-15 ENCOUNTER — TELEPHONE (OUTPATIENT)
Dept: INTERNAL MEDICINE | Facility: CLINIC | Age: 66
End: 2021-09-15

## 2021-09-15 DIAGNOSIS — J01.41 ACUTE RECURRENT PANSINUSITIS: Primary | ICD-10-CM

## 2021-09-15 RX ORDER — AMOXICILLIN AND CLAVULANATE POTASSIUM 875; 125 MG/1; MG/1
1 TABLET, FILM COATED ORAL 2 TIMES DAILY
Qty: 20 TABLET | Refills: 0 | Status: SHIPPED | OUTPATIENT
Start: 2021-09-15 | End: 2021-11-10

## 2021-09-15 NOTE — TELEPHONE ENCOUNTER
Did a tele-visit yesterday and wa told she was going to get Augmentin sent to [facility] - medication did not get sent

## 2021-10-06 RX ORDER — OMEPRAZOLE 20 MG/1
CAPSULE, DELAYED RELEASE ORAL
Qty: 90 CAPSULE | Refills: 3 | Status: SHIPPED | OUTPATIENT
Start: 2021-10-06 | End: 2022-09-19

## 2021-11-10 ENCOUNTER — OFFICE VISIT (OUTPATIENT)
Dept: OBSTETRICS AND GYNECOLOGY | Facility: CLINIC | Age: 66
End: 2021-11-10

## 2021-11-10 VITALS
BODY MASS INDEX: 26.93 KG/M2 | SYSTOLIC BLOOD PRESSURE: 128 MMHG | WEIGHT: 152 LBS | DIASTOLIC BLOOD PRESSURE: 80 MMHG | HEIGHT: 63 IN

## 2021-11-10 DIAGNOSIS — E66.3 OVERWEIGHT WITH BODY MASS INDEX (BMI) OF 26 TO 26.9 IN ADULT: ICD-10-CM

## 2021-11-10 DIAGNOSIS — M85.80 OSTEOPENIA AFTER MENOPAUSE: ICD-10-CM

## 2021-11-10 DIAGNOSIS — Z90.710 S/P HYSTERECTOMY: ICD-10-CM

## 2021-11-10 DIAGNOSIS — N95.1 MENOPAUSAL STATE: Primary | ICD-10-CM

## 2021-11-10 DIAGNOSIS — Z78.0 OSTEOPENIA AFTER MENOPAUSE: ICD-10-CM

## 2021-11-10 PROCEDURE — 99213 OFFICE O/P EST LOW 20 MIN: CPT | Performed by: NURSE PRACTITIONER

## 2021-11-29 ENCOUNTER — OFFICE VISIT (OUTPATIENT)
Dept: INTERNAL MEDICINE | Facility: CLINIC | Age: 66
End: 2021-11-29

## 2021-11-29 DIAGNOSIS — E78.2 MIXED HYPERLIPIDEMIA: ICD-10-CM

## 2021-11-29 DIAGNOSIS — G47.33 OBSTRUCTIVE SLEEP APNEA: ICD-10-CM

## 2021-11-29 DIAGNOSIS — R73.01 IFG (IMPAIRED FASTING GLUCOSE): Primary | ICD-10-CM

## 2021-11-29 DIAGNOSIS — M51.36 DDD (DEGENERATIVE DISC DISEASE), LUMBAR: ICD-10-CM

## 2021-11-29 LAB — HBA1C MFR BLD: 5.7 %

## 2021-11-29 PROCEDURE — 99214 OFFICE O/P EST MOD 30 MIN: CPT | Performed by: INTERNAL MEDICINE

## 2021-11-29 PROCEDURE — 83036 HEMOGLOBIN GLYCOSYLATED A1C: CPT | Performed by: INTERNAL MEDICINE

## 2021-11-29 PROCEDURE — 3044F HG A1C LEVEL LT 7.0%: CPT | Performed by: INTERNAL MEDICINE

## 2021-11-29 NOTE — PROGRESS NOTES
Pati Mcqueen is a 66 y.o. female.   Chief Complaint   Patient presents with   • Prediabetes     a1c: 5.7       History of Present Illness     The following portions of the patient's history were reviewed and updated as appropriate: allergies, current medications, past family history, past medical history, past social history, past surgical history and problem list.    Review of Systems   Constitutional: Negative for activity change, appetite change, fatigue, fever, unexpected weight gain and unexpected weight loss.   HENT: Negative for swollen glands, trouble swallowing and voice change.    Eyes: Negative for blurred vision and visual disturbance.   Respiratory: Negative for cough and shortness of breath.    Cardiovascular: Negative for chest pain, palpitations and leg swelling.   Gastrointestinal: Positive for GERD. Negative for abdominal pain, constipation, diarrhea, nausea, vomiting and indigestion.   Endocrine: Negative for cold intolerance, heat intolerance, polydipsia and polyphagia.   Genitourinary: Negative for dysuria and frequency.   Musculoskeletal: Positive for arthralgias and back pain. Negative for joint swelling and neck pain.   Skin: Negative for color change, rash and skin lesions.   Neurological: Negative for dizziness, weakness, headache, memory problem and confusion.   Hematological: Does not bruise/bleed easily.   Psychiatric/Behavioral: Negative for agitation, hallucinations and suicidal ideas. The patient is not nervous/anxious.        Objective   Past Medical History:   Diagnosis Date   • Chronic back pain    • Chronic neck pain    • Claustrophobia    • Colon polyp    • DDD (degenerative disc disease), lumbar    • GERD (gastroesophageal reflux disease)    • Hyperlipidemia    • Hyperlipidemia    • IBS (irritable bowel syndrome)    • Low back pain    • Peptic ulcer    • Sleep apnea       Past Surgical History:   Procedure Laterality Date   • BLADDER SUSPENSION     • CATARACT  EXTRACTION Bilateral    • COLONOSCOPY  04/16/2015   • COLONOSCOPY N/A 5/31/2018    Procedure: COLONOSCOPY WITH ANESTHESIA;  Surgeon: Jed Lutz MD;  Location: [facility] ENDOSCOPY;  Service: Gastroenterology   • ENDOSCOPY  02/17/2004   • HYSTERECTOMY     • OOPHORECTOMY          Current Outpatient Medications:   •  Calcium & Magnesium Carbonates (MYLANTA PO), Take 1 tablet by mouth As Needed., Disp: , Rfl:   •  CBD (cannabidiol) oral oil, Take  by mouth. patch, Disp: , Rfl:   •  estradiol (CLIMARA) 0.05 MG/24HR patch, PLACE 1 PATCH ON THE SKIN AS DIRECTED BY PROVIDER 1 (ONE) TIME PER WEEK. (Patient taking differently: Place 1 patch on the skin as directed by provider 1 (One) Time Per Week. Pt wears same patch for 11 days), Disp: 12 patch, Rfl: 3  •  fluticasone (FLONASE) 50 MCG/ACT nasal spray, 2 SPRAYS INTO THE NOSTRIL(S) AS DIRECTED BY PROVIDER DAILY., Disp: 48 mL, Rfl: 3  •  GuaiFENesin (MUCINEX PO), Take 1 tablet by mouth 2 (Two) Times a Day., Disp: , Rfl:   •  hyoscyamine sulfate (ANASPAZ) 0.125 MG tablet dispersible disintegrating tablet, PLACE 1 TABLET ON THE TONGUE EVERY 6 (SIX) HOURS AS NEEDED (FOR IBS)., Disp: 60 tablet, Rfl: 1  •  Loratadine (CLARITIN PO), Take 1 tablet by mouth As Needed., Disp: , Rfl:   •  LORazepam (ATIVAN) 0.5 MG tablet, As Needed., Disp: , Rfl:   •  omeprazole (priLOSEC) 20 MG capsule, TAKE 1 CAPSULE BY MOUTH EVERY DAY, Disp: 90 capsule, Rfl: 3  •  oxyCODONE-acetaminophen (PERCOCET) 7.5-325 MG per tablet, Take 1 tablet by mouth 4 (Four) Times a Day., Disp: , Rfl:   •  simvastatin (ZOCOR) 40 MG tablet, TAKE 1 TABLET BY MOUTH EVERY DAY AT NIGHT (Patient taking differently: Every Other Day.), Disp: 90 tablet, Rfl: 1  •  Unable to find, 1 each 1 (One) Time. Vitamin D,C,Zinc patch, Disp: , Rfl:       There were no vitals filed for this visit.  There were no vitals filed for this visit.    There is no height or weight on file to calculate BMI.    Physical Exam  Constitutional:        Appearance: She is well-developed.   HENT:      Head: Normocephalic and atraumatic.   Eyes:      Pupils: Pupils are equal, round, and reactive to light.   Cardiovascular:      Rate and Rhythm: Normal rate and regular rhythm.   Pulmonary:      Effort: Pulmonary effort is normal.      Breath sounds: Normal breath sounds.   Abdominal:      General: Bowel sounds are normal.      Palpations: Abdomen is soft.   Musculoskeletal:      Cervical back: Normal range of motion and neck supple.   Skin:     General: Skin is warm and dry.   Neurological:      Mental Status: She is alert and oriented to person, place, and time.   Psychiatric:         Behavior: Behavior normal.               Assessment/Plan   Diagnoses and all orders for this visit:    1. IFG (impaired fasting glucose) (Primary)  -     POC Glycated Hemoglobin, Total    2. DDD (degenerative disc disease), lumbar    3. Mixed hyperlipidemia  -     ALT  -     AST  -     Lipid Panel    4. Obstructive sleep apnea      Patient is here for follow-up of impaired fasting glucose degenerative arthritis disc disease of her back hyperlipidemia.  She has both obstructive and central sleep apnea she is not using BiPAP at this point she states it blows air down into her stomach and makes her feel bad.  Her A1c indicates good control of her diabetes asking her to have her lipids checked today.

## 2021-11-30 LAB
ALT SERPL-CCNC: 15 U/L (ref 1–33)
AST SERPL-CCNC: 18 U/L (ref 1–32)
CHOLEST SERPL-MCNC: 190 MG/DL (ref 0–200)
HDLC SERPL-MCNC: 66 MG/DL (ref 40–60)
LDLC SERPL CALC-MCNC: 104 MG/DL (ref 0–100)
TRIGL SERPL-MCNC: 114 MG/DL (ref 0–150)
VLDLC SERPL CALC-MCNC: 20 MG/DL (ref 5–40)

## 2021-12-13 NOTE — PROGRESS NOTES
Joi Mcqueen is a 66 y.o.      Chief Complaint   Patient presents with   • Gynecologic Exam     New patient. patient hasnt been seen in office since . patient states that she is here to speak with Marlene in regards to hormone patches. pt states that she does not need a physical because Dr Philip does them. pt is concerned if she needs to continue hormone patches. pt cut patches in half staring 3 weeks ago, denies any changes in mood, denies hot flashes, denies headaches.            HPI - Patient is in today to discuss HRT.  She is using the Climara Patches and has had a hyst.    The following portions of the patient's history were reviewed and updated as appropriate:vital signs, allergies, current medications, past family history, past medical history, past social history, past surgical history and problem list.      Current Outpatient Medications:   •  Calcium & Magnesium Carbonates (MYLANTA PO), Take 1 tablet by mouth As Needed., Disp: , Rfl:   •  CBD (cannabidiol) oral oil, Take  by mouth. patch, Disp: , Rfl:   •  estradiol (CLIMARA) 0.05 MG/24HR patch, PLACE 1 PATCH ON THE SKIN AS DIRECTED BY PROVIDER 1 (ONE) TIME PER WEEK. (Patient taking differently: Place 1 patch on the skin as directed by provider 1 (One) Time Per Week. Pt wears same patch for 11 days), Disp: 12 patch, Rfl: 3  •  fluticasone (FLONASE) 50 MCG/ACT nasal spray, 2 SPRAYS INTO THE NOSTRIL(S) AS DIRECTED BY PROVIDER DAILY., Disp: 48 mL, Rfl: 3  •  GuaiFENesin (MUCINEX PO), Take 1 tablet by mouth 2 (Two) Times a Day., Disp: , Rfl:   •  hyoscyamine sulfate (ANASPAZ) 0.125 MG tablet dispersible disintegrating tablet, PLACE 1 TABLET ON THE TONGUE EVERY 6 (SIX) HOURS AS NEEDED (FOR IBS)., Disp: 60 tablet, Rfl: 1  •  Loratadine (CLARITIN PO), Take 1 tablet by mouth As Needed., Disp: , Rfl:   •  LORazepam (ATIVAN) 0.5 MG tablet, As Needed., Disp: , Rfl:   •  omeprazole (priLOSEC) 20 MG capsule, TAKE 1 CAPSULE BY MOUTH EVERY DAY, Disp:  "90 capsule, Rfl: 3  •  oxyCODONE-acetaminophen (PERCOCET) 7.5-325 MG per tablet, Take 1 tablet by mouth 4 (Four) Times a Day., Disp: , Rfl:   •  simvastatin (ZOCOR) 40 MG tablet, TAKE 1 TABLET BY MOUTH EVERY DAY AT NIGHT (Patient taking differently: Every Other Day.), Disp: 90 tablet, Rfl: 1  •  Unable to find, 1 each 1 (One) Time. Vitamin D,C,Zinc patch, Disp: , Rfl:     Review of Systems   Constitutional: Negative for chills and fever.   HENT: Negative for congestion, ear pain, sneezing, sore throat and tinnitus.    Eyes: Negative for blurred vision, redness, itching and visual disturbance.   Respiratory: Negative for apnea, choking and shortness of breath.    Cardiovascular: Negative for chest pain and palpitations.        Elevated cholesterol   Gastrointestinal: Positive for constipation (chronic) and GERD. Negative for abdominal distention, nausea and vomiting.        History of IBS   Endocrine: Negative for cold intolerance, polydipsia and polyuria.   Genitourinary: Negative for breast pain, decreased urine volume, flank pain, pelvic pain, vaginal bleeding and vaginal pain.   Musculoskeletal: Negative for gait problem and neck pain.        Chronic back pain   DDD   Skin: Negative for color change and pallor.   Neurological: Negative for dizziness, tremors, seizures, speech difficulty, light-headedness and memory problem.   Psychiatric/Behavioral: Positive for sleep disturbance. Negative for behavioral problems, self-injury and suicidal ideas.         Objective     /80   Ht 160 cm (63\")   Wt 68.9 kg (152 lb)   LMP  (LMP Unknown)   BMI 26.93 kg/m²       Physical Exam  Vitals and nursing note reviewed. Exam conducted with a chaperone present.   HENT:      Head: Normocephalic and atraumatic.      Right Ear: External ear normal.      Left Ear: External ear normal.      Nose: No congestion or rhinorrhea.   Eyes:      General:         Right eye: No discharge.         Left eye: No discharge.   Pulmonary:     "  Effort: Pulmonary effort is normal.   Musculoskeletal:         General: No swelling.   Skin:     General: Skin is warm.   Neurological:      General: No focal deficit present.      Mental Status: She is alert and oriented to person, place, and time.   Psychiatric:         Mood and Affect: Mood normal.         Behavior: Behavior normal.         Thought Content: Thought content normal.            Assessment/Plan   Diagnoses and all orders for this visit:    Menopausal state  Comments:  Patient has been using HRT for years and is using Climara .05 but changing about every 11 days so is getting little hormone but may continue as advised of risks of HRT.    Overweight with body mass index (BMI) of 26 to 26.9 in adult  Comments:  Patient is ocunseled that her BMI is slightly outside desired parameters and to decrease carbs and excerise as tolerated which may be limited due to chronic back pain.    S/P hysterectomy  Using low dose estrogen patches, stretching the time to q 11 days versus 7.  Osteopenia after menopause  Comments:  Dexa's ordered by PCP, Dr. Philip.  Counseled r: calcium rich diet an Vit D.    Patient is counseled re: BSE, diet, exercise, mammogram, calcium and Vit. D3  Mammogram and DEXA current.  Patient is counseled re: risk/benefits of HRT and dosage instructions.  Patient is counseled re: side effects to report.                Radha Jackson, APRN  12/13/2021

## 2021-12-15 RX ORDER — ESTRADIOL 0.05 MG/D
1 PATCH TRANSDERMAL WEEKLY
Qty: 12 EACH | Refills: 0 | Status: SHIPPED | OUTPATIENT
Start: 2021-12-15 | End: 2022-07-28

## 2021-12-21 ENCOUNTER — HOSPITAL ENCOUNTER (OUTPATIENT)
Dept: GENERAL RADIOLOGY | Facility: HOSPITAL | Age: 66
Discharge: HOME | End: 2021-12-21
Admitting: NURSE PRACTITIONER

## 2021-12-21 ENCOUNTER — TRANSCRIBE ORDERS (OUTPATIENT)
Dept: ADMINISTRATIVE | Facility: HOSPITAL | Age: 66
End: 2021-12-21

## 2021-12-21 DIAGNOSIS — M54.12 CERVICAL RADICULITIS: Primary | ICD-10-CM

## 2021-12-21 DIAGNOSIS — M54.17 LUMBOSACRAL NEURITIS: ICD-10-CM

## 2021-12-21 PROCEDURE — 72040 X-RAY EXAM NECK SPINE 2-3 VW: CPT

## 2021-12-21 PROCEDURE — 72120 X-RAY BEND ONLY L-S SPINE: CPT

## 2022-01-14 ENCOUNTER — TELEMEDICINE (OUTPATIENT)
Dept: INTERNAL MEDICINE | Facility: CLINIC | Age: 67
End: 2022-01-14

## 2022-01-14 VITALS
WEIGHT: 151.2 LBS | BODY MASS INDEX: 26.79 KG/M2 | HEIGHT: 63 IN | HEART RATE: 97 BPM | SYSTOLIC BLOOD PRESSURE: 132 MMHG | OXYGEN SATURATION: 99 % | DIASTOLIC BLOOD PRESSURE: 74 MMHG | TEMPERATURE: 97.5 F

## 2022-01-14 DIAGNOSIS — J32.0 LEFT MAXILLARY SINUSITIS: Primary | ICD-10-CM

## 2022-01-14 PROCEDURE — 99213 OFFICE O/P EST LOW 20 MIN: CPT | Performed by: FAMILY MEDICINE

## 2022-01-14 RX ORDER — CEFDINIR 300 MG/1
300 CAPSULE ORAL 2 TIMES DAILY
Qty: 20 CAPSULE | Refills: 0 | Status: SHIPPED | OUTPATIENT
Start: 2022-01-14 | End: 2022-08-18

## 2022-01-14 NOTE — PROGRESS NOTES
Subjective     Chief Complaint   Patient presents with   • Sinus Pressure     under left eye and left cheek   • Nasal Congestion     2-3 weeks   • Fatigue     2-3 weeks no cough    • Sore Throat     scratchy started few days ago       History of Present Illness  Patient presents with complaint of left facial pain pressure.  Onset 3 weeks ago.  No improvement over time.  Previous has had similar symptoms.   throat is scratchy.  No cough.  Some nasal mucus.    Patient's PMR from outside medical facility reviewed and noted.    Review of Systems     Otherwise complete ROS reviewed and negative except as mentioned in the HPI.    Past Medical History:   Past Medical History:   Diagnosis Date   • Chronic back pain    • Chronic neck pain    • Claustrophobia    • Colon polyp    • DDD (degenerative disc disease), lumbar    • GERD (gastroesophageal reflux disease)    • Hyperlipidemia    • Hyperlipidemia    • IBS (irritable bowel syndrome)    • Low back pain    • Peptic ulcer    • Sleep apnea      Past Surgical History:  Past Surgical History:   Procedure Laterality Date   • BLADDER SUSPENSION     • CATARACT EXTRACTION Bilateral    • COLONOSCOPY  04/16/2015   • COLONOSCOPY N/A 5/31/2018    Procedure: COLONOSCOPY WITH ANESTHESIA;  Surgeon: Jed Lutz MD;  Location: [facility] ENDOSCOPY;  Service: Gastroenterology   • ENDOSCOPY  02/17/2004   • HYSTERECTOMY     • OOPHORECTOMY       Social History:  reports that she quit smoking about 16 years ago. Her smoking use included cigarettes. She has never used smokeless tobacco. She reports that she does not drink alcohol and does not use drugs.    Family History: family history includes Cancer in her brother, father, and mother; Colon cancer in her maternal aunt; Hypertension in her mother.       Allergies:  Allergies   Allergen Reactions   • Beta Adrenergic Blockers Anaphylaxis   • Tetracyclines & Related Anaphylaxis   • Monistat [Miconazole] Swelling   • Avelox [Moxifloxacin  Hcl] Nausea And Vomiting   • Clindamycin/Lincomycin Nausea And Vomiting   • Demerol [Meperidine] Other (See Comments)     Paul not help   • Erythromycin Other (See Comments)     Pt states she in no longer allergic   • Flagyl [Metronidazole] Nausea And Vomiting   • Morphine And Related Other (See Comments)     Does not help   • Nsaids Other (See Comments)     reflux   • Tramadol Nausea And Vomiting     Medications:  Prior to Admission medications    Medication Sig Start Date End Date Taking? Authorizing Provider   Calcium & Magnesium Carbonates (MYLANTA PO) Take 1 tablet by mouth As Needed.   Yes ProviderFlorence MD   CBD (cannabidiol) oral oil Take  by mouth. patch   Yes ProviderFlorence MD   estradiol (CLIMARA) 0.05 MG/24HR patch Place 1 patch on the skin as directed by provider 1 (One) Time Per Week. Pt wears same patch for 11 days 12/15/21  Yes Isamar Adam APRN   fluticasone (FLONASE) 50 MCG/ACT nasal spray 2 SPRAYS INTO THE NOSTRIL(S) AS DIRECTED BY PROVIDER DAILY. 4/19/21  Yes Isamar Adam APRN   GuaiFENesin (MUCINEX PO) Take 1 tablet by mouth 2 (Two) Times a Day.   Yes ProviderFlorence MD   hyoscyamine sulfate (ANASPAZ) 0.125 MG tablet dispersible disintegrating tablet PLACE 1 TABLET ON THE TONGUE EVERY 6 (SIX) HOURS AS NEEDED (FOR IBS). 5/28/20  Yes Cameron Philip MD   Loratadine (CLARITIN PO) Take 1 tablet by mouth As Needed.   Yes ProviderFlorence MD   LORazepam (ATIVAN) 0.5 MG tablet As Needed. 5/18/17  Yes Emergency, Nurse Blu RN   omeprazole (priLOSEC) 20 MG capsule TAKE 1 CAPSULE BY MOUTH EVERY DAY 10/6/21  Yes Cameron Philip MD   oxyCODONE-acetaminophen (PERCOCET) 7.5-325 MG per tablet Take 1 tablet by mouth 4 (Four) Times a Day. 11/7/17  Yes Emergency, Nurse Blu RN   simvastatin (ZOCOR) 40 MG tablet TAKE 1 TABLET BY MOUTH EVERY DAY AT NIGHT  Patient taking differently: Every Other Day. 12/17/20  Yes Isamar Adam APRN  "  cefdinir (OMNICEF) 300 MG capsule Take 1 capsule by mouth 2 (Two) Times a Day. 1/14/22   Laurie Tamayo, DO   Unable to find 1 each 1 (One) Time. Vitamin D,C,Zinc patch    Provider, MD Florence       Objective     Vital Signs: /74 (BP Location: Left arm, Patient Position: Sitting, Cuff Size: Adult)   Pulse 97   Temp 97.5 °F (36.4 °C) (Temporal)   Ht 160 cm (63\")   Wt 68.6 kg (151 lb 3.2 oz)   LMP  (LMP Unknown)   SpO2 99%   BMI 26.78 kg/m²   Physical Exam  Vitals and nursing note reviewed.   Constitutional:       Appearance: Normal appearance. She is well-developed and normal weight.   HENT:      Head: Normocephalic and atraumatic.      Right Ear: External ear normal.      Left Ear: External ear normal.      Ears:      Comments: Bilateral TMs dull with mild erythema     Nose: Congestion (some mucus banding.) present.      Comments: Tender to palpation over frontal and maxillary sinus on the left.      Mouth/Throat:      Mouth: Mucous membranes are moist.   Eyes:      General: No scleral icterus.        Right eye: No discharge.         Left eye: No discharge.      Conjunctiva/sclera: Conjunctivae normal.      Pupils: Pupils are equal, round, and reactive to light.   Neck:      Thyroid: No thyromegaly.      Vascular: No JVD.      Trachea: No tracheal deviation.   Cardiovascular:      Rate and Rhythm: Normal rate and regular rhythm.      Heart sounds: Normal heart sounds. No murmur heard.  No friction rub. No gallop.    Pulmonary:      Effort: Pulmonary effort is normal.      Breath sounds: Normal breath sounds.   Abdominal:      General: Bowel sounds are normal. There is no distension.      Palpations: Abdomen is soft.      Tenderness: There is no abdominal tenderness.   Musculoskeletal:         General: Normal range of motion.      Cervical back: Normal range of motion and neck supple.   Lymphadenopathy:      Cervical: No cervical adenopathy.   Skin:     General: Skin is warm and dry.      " Capillary Refill: Capillary refill takes less than 2 seconds.   Neurological:      Mental Status: She is alert and oriented to person, place, and time.      Cranial Nerves: No cranial nerve deficit.      Coordination: Coordination normal.   Psychiatric:         Behavior: Behavior normal.         Patient's Body mass index is 26.78 kg/m².       Results Reviewed:  Glucose   Date Value Ref Range Status   05/17/2021 103 (H) 65 - 99 mg/dL Final     BUN   Date Value Ref Range Status   05/17/2021 12 8 - 23 mg/dL Final     Creatinine   Date Value Ref Range Status   05/17/2021 0.81 0.57 - 1.00 mg/dL Final     Sodium   Date Value Ref Range Status   05/17/2021 138 136 - 145 mmol/L Final     Potassium   Date Value Ref Range Status   05/17/2021 4.3 3.5 - 5.2 mmol/L Final     Chloride   Date Value Ref Range Status   05/17/2021 104 98 - 107 mmol/L Final     Total CO2   Date Value Ref Range Status   05/17/2021 27.2 22.0 - 29.0 mmol/L Final     Calcium   Date Value Ref Range Status   05/17/2021 9.2 8.6 - 10.5 mg/dL Final     ALT (SGPT)   Date Value Ref Range Status   11/29/2021 15 1 - 33 U/L Final     AST (SGOT)   Date Value Ref Range Status   11/29/2021 18 1 - 32 U/L Final     WBC   Date Value Ref Range Status   05/17/2021 6.06 3.40 - 10.80 10*3/mm3 Final     Hematocrit   Date Value Ref Range Status   05/17/2021 40.9 34.0 - 46.6 % Final     Platelets   Date Value Ref Range Status   05/17/2021 227 140 - 450 10*3/mm3 Final     Triglycerides   Date Value Ref Range Status   11/29/2021 114 0 - 150 mg/dL Final     HDL Cholesterol   Date Value Ref Range Status   11/29/2021 66 (H) 40 - 60 mg/dL Final     LDL Chol Calc (NIH)   Date Value Ref Range Status   11/29/2021 104 (H) 0 - 100 mg/dL Final     Hemoglobin A1C   Date Value Ref Range Status   11/29/2021 5.7 % Final         Assessment / Plan     Assessment/Plan:  1. Left maxillary sinusitis  omnicef 300 mg po bid 10 days  Continue flonase.       Return if symptoms worsen or fail to improve.  unless patient needs to be seen sooner or acute issues arise.        I have discussed the patient results/orders and and plan/recommendation with them at today's visit.      Laurie Tamayo,    01/14/2022

## 2022-01-14 NOTE — PROGRESS NOTES
Subjective     Chief Complaint   Patient presents with   • Sinus Pressure     under left eye and left cheek   • Nasal Congestion     2-3 weeks   • Fatigue     2-3 weeks no cough    • Sore Throat     scratchy started few days ago       History of Present Illness  ***  Patient's PMR from outside medical facility reviewed and noted.    Review of Systems   ***  Otherwise complete ROS reviewed and negative except as mentioned in the HPI.    Past Medical History:   Past Medical History:   Diagnosis Date   • Chronic back pain    • Chronic neck pain    • Claustrophobia    • Colon polyp    • DDD (degenerative disc disease), lumbar    • GERD (gastroesophageal reflux disease)    • Hyperlipidemia    • Hyperlipidemia    • IBS (irritable bowel syndrome)    • Low back pain    • Peptic ulcer    • Sleep apnea      Past Surgical History:  Past Surgical History:   Procedure Laterality Date   • BLADDER SUSPENSION     • CATARACT EXTRACTION Bilateral    • COLONOSCOPY  04/16/2015   • COLONOSCOPY N/A 5/31/2018    Procedure: COLONOSCOPY WITH ANESTHESIA;  Surgeon: Jed Lutz MD;  Location: [facility] ENDOSCOPY;  Service: Gastroenterology   • ENDOSCOPY  02/17/2004   • HYSTERECTOMY     • OOPHORECTOMY       Social History:  reports that she quit smoking about 16 years ago. Her smoking use included cigarettes. She has never used smokeless tobacco. She reports that she does not drink alcohol and does not use drugs.    Family History: family history includes Cancer in her brother, father, and mother; Colon cancer in her maternal aunt; Hypertension in her mother.   ***    Allergies:  Allergies   Allergen Reactions   • Beta Adrenergic Blockers Anaphylaxis   • Tetracyclines & Related Anaphylaxis   • Monistat [Miconazole] Swelling   • Avelox [Moxifloxacin Hcl] Nausea And Vomiting   • Clindamycin/Lincomycin Nausea And Vomiting   • Demerol [Meperidine] Other (See Comments)     Paul not help   • Erythromycin Other (See Comments)     Pt states  she in no longer allergic   • Flagyl [Metronidazole] Nausea And Vomiting   • Morphine And Related Other (See Comments)     Does not help   • Nsaids Other (See Comments)     reflux   • Tramadol Nausea And Vomiting     Medications:  Prior to Admission medications    Medication Sig Start Date End Date Taking? Authorizing Provider   Calcium & Magnesium Carbonates (MYLANTA PO) Take 1 tablet by mouth As Needed.   Yes Florence Brandon MD   CBD (cannabidiol) oral oil Take  by mouth. patch   Yes Florence Brandon MD   estradiol (CLIMARA) 0.05 MG/24HR patch Place 1 patch on the skin as directed by provider 1 (One) Time Per Week. Pt wears same patch for 11 days 12/15/21  Yes Isamar Adam APRN   fluticasone (FLONASE) 50 MCG/ACT nasal spray 2 SPRAYS INTO THE NOSTRIL(S) AS DIRECTED BY PROVIDER DAILY. 4/19/21  Yes Isamar Adam APRN   GuaiFENesin (MUCINEX PO) Take 1 tablet by mouth 2 (Two) Times a Day.   Yes Florence Brandon MD   hyoscyamine sulfate (ANASPAZ) 0.125 MG tablet dispersible disintegrating tablet PLACE 1 TABLET ON THE TONGUE EVERY 6 (SIX) HOURS AS NEEDED (FOR IBS). 5/28/20  Yes Cameron Philip MD   Loratadine (CLARITIN PO) Take 1 tablet by mouth As Needed.   Yes Florence Brandon MD   LORazepam (ATIVAN) 0.5 MG tablet As Needed. 5/18/17  Yes Emergency, Nurse Epic, RN   omeprazole (priLOSEC) 20 MG capsule TAKE 1 CAPSULE BY MOUTH EVERY DAY 10/6/21  Yes Cameron Philip MD   oxyCODONE-acetaminophen (PERCOCET) 7.5-325 MG per tablet Take 1 tablet by mouth 4 (Four) Times a Day. 11/7/17  Yes Emergency, Nurse JEWEL Hurt   simvastatin (ZOCOR) 40 MG tablet TAKE 1 TABLET BY MOUTH EVERY DAY AT NIGHT  Patient taking differently: Every Other Day. 12/17/20  Yes Isamar Adam APRN   Unable to find 1 each 1 (One) Time. Vitamin D,C,Zinc patch    ProviderFlorence MD       Objective     Vital Signs: /74 (BP Location: Left arm, Patient Position: Sitting, Cuff Size:  "Adult)   Pulse 97   Temp 97.5 °F (36.4 °C) (Temporal)   Ht 160 cm (63\")   Wt 68.6 kg (151 lb 3.2 oz)   LMP  (LMP Unknown)   SpO2 99%   BMI 26.78 kg/m²   Physical Exam  ***  Patient's Body mass index is 26.78 kg/m². indicating that she is {weight categories:66567}.      Results Reviewed:  Glucose   Date Value Ref Range Status   05/17/2021 103 (H) 65 - 99 mg/dL Final     BUN   Date Value Ref Range Status   05/17/2021 12 8 - 23 mg/dL Final     Creatinine   Date Value Ref Range Status   05/17/2021 0.81 0.57 - 1.00 mg/dL Final     Sodium   Date Value Ref Range Status   05/17/2021 138 136 - 145 mmol/L Final     Potassium   Date Value Ref Range Status   05/17/2021 4.3 3.5 - 5.2 mmol/L Final     Chloride   Date Value Ref Range Status   05/17/2021 104 98 - 107 mmol/L Final     Total CO2   Date Value Ref Range Status   05/17/2021 27.2 22.0 - 29.0 mmol/L Final     Calcium   Date Value Ref Range Status   05/17/2021 9.2 8.6 - 10.5 mg/dL Final     ALT (SGPT)   Date Value Ref Range Status   11/29/2021 15 1 - 33 U/L Final     AST (SGOT)   Date Value Ref Range Status   11/29/2021 18 1 - 32 U/L Final     WBC   Date Value Ref Range Status   05/17/2021 6.06 3.40 - 10.80 10*3/mm3 Final     Hematocrit   Date Value Ref Range Status   05/17/2021 40.9 34.0 - 46.6 % Final     Platelets   Date Value Ref Range Status   05/17/2021 227 140 - 450 10*3/mm3 Final     Triglycerides   Date Value Ref Range Status   11/29/2021 114 0 - 150 mg/dL Final     HDL Cholesterol   Date Value Ref Range Status   11/29/2021 66 (H) 40 - 60 mg/dL Final     LDL Chol Calc (NIH)   Date Value Ref Range Status   11/29/2021 104 (H) 0 - 100 mg/dL Final     Hemoglobin A1C   Date Value Ref Range Status   11/29/2021 5.7 % Final         Assessment / Plan     Assessment/Plan:  There are no diagnoses linked to this encounter.      No follow-ups on file. unless patient needs to be seen sooner or acute issues arise.    Code Status: <no information> ***    I have discussed " the patient results/orders and and plan/recommendation with them at today's visit.      Laurie Tamayo,    01/14/2022

## 2022-02-25 DIAGNOSIS — F41.9 ANXIETY: Primary | ICD-10-CM

## 2022-03-01 RX ORDER — LORAZEPAM 0.5 MG/1
0.5 TABLET ORAL AS NEEDED
Qty: 20 TABLET | Refills: 0 | Status: SHIPPED | OUTPATIENT
Start: 2022-03-01

## 2022-07-11 RX ORDER — FLUTICASONE PROPIONATE 50 MCG
2 SPRAY, SUSPENSION (ML) NASAL DAILY
Qty: 48 ML | Refills: 3 | Status: SHIPPED | OUTPATIENT
Start: 2022-07-11

## 2022-07-21 ENCOUNTER — LAB (OUTPATIENT)
Dept: INTERNAL MEDICINE | Facility: CLINIC | Age: 67
End: 2022-07-21

## 2022-07-21 DIAGNOSIS — Z79.899 ENCOUNTER FOR LONG-TERM CURRENT USE OF MEDICATION: ICD-10-CM

## 2022-07-21 DIAGNOSIS — R73.01 IFG (IMPAIRED FASTING GLUCOSE): ICD-10-CM

## 2022-07-21 DIAGNOSIS — E78.2 MIXED HYPERLIPIDEMIA: ICD-10-CM

## 2022-07-22 LAB
ALBUMIN SERPL-MCNC: 4.4 G/DL (ref 3.5–5.2)
ALBUMIN/GLOB SERPL: 2 G/DL
ALP SERPL-CCNC: 52 U/L (ref 39–117)
ALT SERPL-CCNC: 15 U/L (ref 1–33)
APPEARANCE UR: CLEAR
AST SERPL-CCNC: 19 U/L (ref 1–32)
BACTERIA #/AREA URNS HPF: NORMAL /HPF
BASOPHILS # BLD AUTO: 0.03 10*3/MM3 (ref 0–0.2)
BASOPHILS NFR BLD AUTO: 0.5 % (ref 0–1.5)
BILIRUB SERPL-MCNC: 0.5 MG/DL (ref 0–1.2)
BILIRUB UR QL STRIP: NEGATIVE
BUN SERPL-MCNC: 11 MG/DL (ref 8–23)
BUN/CREAT SERPL: 13.3 (ref 7–25)
CALCIUM SERPL-MCNC: 9.5 MG/DL (ref 8.6–10.5)
CASTS URNS MICRO: NORMAL
CHLORIDE SERPL-SCNC: 101 MMOL/L (ref 98–107)
CHOLEST SERPL-MCNC: 216 MG/DL (ref 0–200)
CO2 SERPL-SCNC: 27.5 MMOL/L (ref 22–29)
COLOR UR: YELLOW
CREAT SERPL-MCNC: 0.83 MG/DL (ref 0.57–1)
EGFRCR SERPLBLD CKD-EPI 2021: 76.9 ML/MIN/1.73
EOSINOPHIL # BLD AUTO: 0.1 10*3/MM3 (ref 0–0.4)
EOSINOPHIL NFR BLD AUTO: 1.7 % (ref 0.3–6.2)
EPI CELLS #/AREA URNS HPF: NORMAL /HPF
ERYTHROCYTE [DISTWIDTH] IN BLOOD BY AUTOMATED COUNT: 12.4 % (ref 12.3–15.4)
GLOBULIN SER CALC-MCNC: 2.2 GM/DL
GLUCOSE SERPL-MCNC: 94 MG/DL (ref 65–99)
GLUCOSE UR QL STRIP: NEGATIVE
HBA1C MFR BLD: 6 % (ref 4.8–5.6)
HCT VFR BLD AUTO: 41.6 % (ref 34–46.6)
HDLC SERPL-MCNC: 63 MG/DL (ref 40–60)
HGB BLD-MCNC: 13.3 G/DL (ref 12–15.9)
HGB UR QL STRIP: NEGATIVE
IMM GRANULOCYTES # BLD AUTO: 0.01 10*3/MM3 (ref 0–0.05)
IMM GRANULOCYTES NFR BLD AUTO: 0.2 % (ref 0–0.5)
KETONES UR QL STRIP: NEGATIVE
LDLC SERPL CALC-MCNC: 132 MG/DL (ref 0–100)
LEUKOCYTE ESTERASE UR QL STRIP: NEGATIVE
LYMPHOCYTES # BLD AUTO: 2.21 10*3/MM3 (ref 0.7–3.1)
LYMPHOCYTES NFR BLD AUTO: 37.7 % (ref 19.6–45.3)
MCH RBC QN AUTO: 29.9 PG (ref 26.6–33)
MCHC RBC AUTO-ENTMCNC: 32 G/DL (ref 31.5–35.7)
MCV RBC AUTO: 93.5 FL (ref 79–97)
MONOCYTES # BLD AUTO: 0.42 10*3/MM3 (ref 0.1–0.9)
MONOCYTES NFR BLD AUTO: 7.2 % (ref 5–12)
NEUTROPHILS # BLD AUTO: 3.09 10*3/MM3 (ref 1.7–7)
NEUTROPHILS NFR BLD AUTO: 52.7 % (ref 42.7–76)
NITRITE UR QL STRIP: NEGATIVE
NRBC BLD AUTO-RTO: 0 /100 WBC (ref 0–0.2)
PH UR STRIP: 7 [PH] (ref 5–8)
PLATELET # BLD AUTO: 258 10*3/MM3 (ref 140–450)
POTASSIUM SERPL-SCNC: 5.3 MMOL/L (ref 3.5–5.2)
PROT SERPL-MCNC: 6.6 G/DL (ref 6–8.5)
PROT UR QL STRIP: NEGATIVE
RBC # BLD AUTO: 4.45 10*6/MM3 (ref 3.77–5.28)
RBC #/AREA URNS HPF: NORMAL /HPF
SODIUM SERPL-SCNC: 137 MMOL/L (ref 136–145)
SP GR UR STRIP: 1.01 (ref 1–1.03)
TRIGL SERPL-MCNC: 121 MG/DL (ref 0–150)
TSH SERPL DL<=0.005 MIU/L-ACNC: 0.8 UIU/ML (ref 0.27–4.2)
URATE SERPL-MCNC: 4.1 MG/DL (ref 2.4–5.7)
UROBILINOGEN UR STRIP-MCNC: NORMAL MG/DL
VLDLC SERPL CALC-MCNC: 21 MG/DL (ref 5–40)
WBC # BLD AUTO: 5.86 10*3/MM3 (ref 3.4–10.8)
WBC #/AREA URNS HPF: NORMAL /HPF

## 2022-07-28 ENCOUNTER — OFFICE VISIT (OUTPATIENT)
Dept: INTERNAL MEDICINE | Facility: CLINIC | Age: 67
End: 2022-07-28

## 2022-07-28 VITALS
BODY MASS INDEX: 24.8 KG/M2 | HEART RATE: 78 BPM | TEMPERATURE: 96.9 F | WEIGHT: 140 LBS | DIASTOLIC BLOOD PRESSURE: 84 MMHG | OXYGEN SATURATION: 97 % | SYSTOLIC BLOOD PRESSURE: 124 MMHG | HEIGHT: 63 IN

## 2022-07-28 DIAGNOSIS — R73.01 IFG (IMPAIRED FASTING GLUCOSE): ICD-10-CM

## 2022-07-28 DIAGNOSIS — E55.9 HYPOVITAMINOSIS D: ICD-10-CM

## 2022-07-28 DIAGNOSIS — Z00.00 ENCOUNTER FOR SUBSEQUENT ANNUAL WELLNESS VISIT (AWV) IN MEDICARE PATIENT: Primary | ICD-10-CM

## 2022-07-28 DIAGNOSIS — K58.2 IRRITABLE BOWEL SYNDROME WITH BOTH CONSTIPATION AND DIARRHEA: ICD-10-CM

## 2022-07-28 DIAGNOSIS — E66.3 OVER WEIGHT: ICD-10-CM

## 2022-07-28 DIAGNOSIS — K21.9 GASTROESOPHAGEAL REFLUX DISEASE WITHOUT ESOPHAGITIS: ICD-10-CM

## 2022-07-28 DIAGNOSIS — E78.2 MIXED HYPERLIPIDEMIA: ICD-10-CM

## 2022-07-28 DIAGNOSIS — E87.5 HYPERKALEMIA: ICD-10-CM

## 2022-07-28 DIAGNOSIS — Z12.31 ENCOUNTER FOR SCREENING MAMMOGRAM FOR MALIGNANT NEOPLASM OF BREAST: ICD-10-CM

## 2022-07-28 PROCEDURE — 1126F AMNT PAIN NOTED NONE PRSNT: CPT | Performed by: FAMILY MEDICINE

## 2022-07-28 PROCEDURE — 1159F MED LIST DOCD IN RCRD: CPT | Performed by: FAMILY MEDICINE

## 2022-07-28 PROCEDURE — G0439 PPPS, SUBSEQ VISIT: HCPCS | Performed by: FAMILY MEDICINE

## 2022-07-28 PROCEDURE — 1170F FXNL STATUS ASSESSED: CPT | Performed by: FAMILY MEDICINE

## 2022-07-28 PROCEDURE — 99212 OFFICE O/P EST SF 10 MIN: CPT | Performed by: FAMILY MEDICINE

## 2022-07-29 LAB
25(OH)D3+25(OH)D2 SERPL-MCNC: 40.6 NG/ML (ref 30–100)
POTASSIUM SERPL-SCNC: 5.2 MMOL/L (ref 3.5–5.2)

## 2022-08-02 ENCOUNTER — HOSPITAL ENCOUNTER (OUTPATIENT)
Dept: MAMMOGRAPHY | Facility: HOSPITAL | Age: 67
Discharge: HOME | End: 2022-08-02
Admitting: FAMILY MEDICINE

## 2022-08-02 DIAGNOSIS — Z12.31 ENCOUNTER FOR SCREENING MAMMOGRAM FOR MALIGNANT NEOPLASM OF BREAST: ICD-10-CM

## 2022-08-02 PROCEDURE — 77063 BREAST TOMOSYNTHESIS BI: CPT

## 2022-08-02 PROCEDURE — 77067 SCR MAMMO BI INCL CAD: CPT

## 2022-08-18 ENCOUNTER — OFFICE VISIT (OUTPATIENT)
Dept: INTERNAL MEDICINE | Facility: CLINIC | Age: 67
End: 2022-08-18

## 2022-08-18 DIAGNOSIS — J01.41 ACUTE RECURRENT PANSINUSITIS: Primary | ICD-10-CM

## 2022-08-18 PROCEDURE — 99441 PR PHYS/QHP TELEPHONE EVALUATION 5-10 MIN: CPT | Performed by: NURSE PRACTITIONER

## 2022-08-18 RX ORDER — METHYLPREDNISOLONE 4 MG/1
TABLET ORAL
Qty: 21 TABLET | Refills: 0 | Status: SHIPPED | OUTPATIENT
Start: 2022-08-18 | End: 2023-01-09 | Stop reason: SDUPTHER

## 2022-08-18 RX ORDER — AMOXICILLIN AND CLAVULANATE POTASSIUM 875; 125 MG/1; MG/1
1 TABLET, FILM COATED ORAL 2 TIMES DAILY
Qty: 20 TABLET | Refills: 0 | Status: SHIPPED | OUTPATIENT
Start: 2022-08-18 | End: 2022-08-28

## 2022-08-18 NOTE — PROGRESS NOTES
Subjective     Chief Complaint:  Sinus symptoms    HPI:  You have chosen to receive care through a telephone visit. Do you consent to use a telephone visit for your medical care today? Yes    Patient presents via telephone visit with complaints of sinus symptoms.  She states that her symptoms may have actually started prior to her annual wellness visit on 7/28 but she did not think to mention the symptoms at that visit because she usually likes to give it time to resolve on its own.  She states she gets the same sinus symptoms a few times a year.  [place] her symptoms are especially bad when she has been out in the [place].  She describes sinus pressure above her eyes and in her cheeks.  She states that her nose feels stuffy and she feels she has some postnasal drip.  At times she can hear her heart beating in her ears, but does not describe any ear pain.  She denies any sore throat.  She denies fever or chills.  She states she is not really coughing.  She denies any shortness of breath or chest pain.  She denies nausea, vomiting, or diarrhea.  She has not taken a COVID test at home and feels that her symptoms would not be related to COVID.  She does take Claritin and Flonase daily and also uses nasal irrigation.    Patient's PMR from outside medical facility reviewed and noted.    Past Medical History:   Past Medical History:   Diagnosis Date   • Chronic back pain    • Chronic neck pain    • Claustrophobia    • Colon polyp    • DDD (degenerative disc disease), lumbar    • GERD (gastroesophageal reflux disease)    • Hyperlipidemia    • Hyperlipidemia    • IBS (irritable bowel syndrome)    • Low back pain    • Peptic ulcer    • Sleep apnea      Past Surgical History:  Past Surgical History:   Procedure Laterality Date   • BLADDER SUSPENSION     • CATARACT EXTRACTION Bilateral    • COLONOSCOPY  04/16/2015   • COLONOSCOPY N/A 5/31/2018    Procedure: COLONOSCOPY WITH ANESTHESIA;  Surgeon:  Jed Lutz MD;  Location: [facility] ENDOSCOPY;  Service: Gastroenterology   • ENDOSCOPY  02/17/2004   • HYSTERECTOMY     • OOPHORECTOMY       Social History:  reports that she quit smoking about 16 years ago. Her smoking use included cigarettes. She has never used smokeless tobacco. She reports that she does not drink alcohol and does not use drugs.    Family History: family history includes Cancer in her brother, father, and mother; Colon cancer in her maternal aunt; Hypertension in her mother.      Allergies:  Allergies   Allergen Reactions   • Beta Adrenergic Blockers Anaphylaxis   • Tetracyclines & Related Anaphylaxis   • Monistat [Miconazole] Swelling   • Avelox [Moxifloxacin Hcl] Nausea And Vomiting   • Clindamycin/Lincomycin Nausea And Vomiting   • Demerol [Meperidine] Other (See Comments)     Paul not help   • Erythromycin Other (See Comments)     Pt states she in no longer allergic   • Flagyl [Metronidazole] Nausea And Vomiting   • Morphine And Related Other (See Comments)     Does not help   • Nsaids Other (See Comments)     reflux   • Tramadol Nausea And Vomiting     Medications:  Prior to Admission medications    Medication Sig Start Date End Date Taking? Authorizing Provider   fluticasone (FLONASE) 50 MCG/ACT nasal spray 2 sprays into the nostril(s) as directed by provider Daily. 7/11/22  Yes Laurie Tamayo DO   GuaiFENesin (MUCINEX PO) Take 1 tablet by mouth 2 (Two) Times a Day.   Yes Florence Brandon MD   hyoscyamine sulfate (ANASPAZ) 0.125 MG tablet dispersible disintegrating tablet PLACE 1 TABLET ON THE TONGUE EVERY 6 (SIX) HOURS AS NEEDED (FOR IBS). 5/28/20  Yes Cameron Philip MD   Loratadine (CLARITIN PO) Take 1 tablet by mouth As Needed.   Yes Florence Brandon MD   LORazepam (ATIVAN) 0.5 MG tablet Take 1 tablet by mouth As Needed for Anxiety. 3/1/22  Yes Laurie Tamayo DO   omeprazole (priLOSEC) 20 MG capsule TAKE 1 CAPSULE BY MOUTH EVERY DAY 10/6/21  Yes Cedrick  Cameron NAVARRO MD   oxyCODONE-acetaminophen (PERCOCET) 7.5-325 MG per tablet Take 1 tablet by mouth 4 (Four) Times a Day. 11/7/17  Yes Emergency, Nurse Epic, RN   cefdinir (OMNICEF) 300 MG capsule Take 1 capsule by mouth 2 (Two) Times a Day. 1/14/22 8/18/22 Yes Laurie Tamayo DO   amoxicillin-clavulanate (Augmentin) 875-125 MG per tablet Take 1 tablet by mouth 2 (Two) Times a Day for 10 days. 8/18/22 8/28/22  Meli Fernandez APRN   methylPREDNISolone (MEDROL) 4 MG dose pack Take as directed on package instructions. 8/18/22   Meli Fernandez APRN   Unable to find 1 each 1 (One) Time. Vitamin D,C,Zinc patch    Provider, MD Florence       Objective     Vital Signs: LMP  (LMP Unknown)   Physical Exam  No physical exam-telephone visit    Results Reviewed:  Reviewed patient's last office visit note from annual wellness visit 7/28/2022 and reviewed a telemedicine visit from 1/14/2022.    Assessment / Plan     Assessment/Plan:  Diagnoses and all orders for this visit:    1. Acute recurrent pansinusitis (Primary)    Other orders  -     amoxicillin-clavulanate (Augmentin) 875-125 MG per tablet; Take 1 tablet by mouth 2 (Two) Times a Day for 10 days.  Dispense: 20 tablet; Refill: 0  -     methylPREDNISolone (MEDROL) 4 MG dose pack; Take as directed on package instructions.  Dispense: 21 tablet; Refill: 0       Patient presents with a several week history of sinus pressure and does have recurrent sinus infections, last noted in January.  We will treat with Augmentin for a 10-day course and Medrol Dosepak.  Advised patient to continue her regular home medications Claritin, Flonase, and use nasal irrigation as needed.  She is to call for any worsening symptoms.  She declines COVID testing at this time.    Return if symptoms worsen or fail to improve. unless patient needs to be seen sooner or acute issues arise.    I have discussed the patient results/orders and and plan/recommendation with them at today's visit.   Total time spent on the phone with patient was 5 minutes and 12 seconds.    Meli Fernandez, BERT   08/18/2022

## 2022-09-19 RX ORDER — OMEPRAZOLE 20 MG/1
CAPSULE, DELAYED RELEASE ORAL
Qty: 90 CAPSULE | Refills: 1 | Status: SHIPPED | OUTPATIENT
Start: 2022-09-19 | End: 2023-03-20

## 2023-01-09 ENCOUNTER — TELEPHONE (OUTPATIENT)
Dept: INTERNAL MEDICINE | Facility: CLINIC | Age: 68
End: 2023-01-09
Payer: MEDICARE

## 2023-01-09 ENCOUNTER — OFFICE VISIT (OUTPATIENT)
Dept: INTERNAL MEDICINE | Facility: CLINIC | Age: 68
End: 2023-01-09
Payer: MEDICARE

## 2023-01-09 DIAGNOSIS — J01.11 ACUTE RECURRENT FRONTAL SINUSITIS: Primary | ICD-10-CM

## 2023-01-09 PROCEDURE — 99441 PR PHYS/QHP TELEPHONE EVALUATION 5-10 MIN: CPT | Performed by: NURSE PRACTITIONER

## 2023-01-09 RX ORDER — AMOXICILLIN AND CLAVULANATE POTASSIUM 875; 125 MG/1; MG/1
1 TABLET, FILM COATED ORAL 2 TIMES DAILY
Qty: 20 TABLET | Refills: 0 | Status: SHIPPED | OUTPATIENT
Start: 2023-01-09 | End: 2023-01-19

## 2023-01-09 RX ORDER — METHYLPREDNISOLONE 4 MG/1
TABLET ORAL
Qty: 21 TABLET | Refills: 0 | Status: SHIPPED | OUTPATIENT
Start: 2023-01-09 | End: 2023-01-25

## 2023-01-09 NOTE — TELEPHONE ENCOUNTER
Caller: Joi Mcqueen A    Relationship: Self    Best call back number: [phone]    What are your current symptoms: POST NASAL DRIP, PRESSURE AROUND FACE AND EYES, SYMPTOMS OF SINUS INFECTION     How long have you been experiencing symptoms: SINCE NOVEMBER    Have you had these symptoms before:    [x] Yes  [] No    Have you been treated for these symptoms before:   [x] Yes  [] No    If a prescription is needed, what is your preferred pharmacy and phone number: [id number] - [place] - [address] AT ACROSS FROM [address]  [phone] [facility] [phone] FX

## 2023-01-09 NOTE — PROGRESS NOTES
Subjective     Chief Complaint:  Sinus Pressure    HPI:  You have chosen to receive care through a telephone visit. Do you consent to use a telephone visit for your medical care today? Yes    Patient presents via telephone visit. She states that her symptoms started in November 2022 with pain/pressure above her eyes and a runny nose. Now the symptoms have progressed to postnasal drip and her throat is starting to get sore. She dose take flonase and claritin daily. She is using Emergen-C as well. She also uses a Neti pot for sinus rinses twice daily. She has seen [facility] in the past. He has recommended surgery in the past for a deviated septum and she does not wish to proceed with surgery. She does not wish to be referred back to ENT at this time. Denies cough, shortness of breath, fever/chills. She states that the symptoms wax and wane.     Patient's PMR from outside medical facility reviewed and noted.    Past Medical History:   Past Medical History:   Diagnosis Date   • Chronic back pain    • Chronic neck pain    • Claustrophobia    • Colon polyp    • DDD (degenerative disc disease), lumbar    • GERD (gastroesophageal reflux disease)    • Hyperlipidemia    • Hyperlipidemia    • IBS (irritable bowel syndrome)    • Low back pain    • Peptic ulcer    • Sleep apnea      Past Surgical History:  Past Surgical History:   Procedure Laterality Date   • BLADDER SUSPENSION     • CATARACT EXTRACTION Bilateral    • COLONOSCOPY  04/16/2015   • COLONOSCOPY N/A 5/31/2018    Procedure: COLONOSCOPY WITH ANESTHESIA;  Surgeon: Jed Lutz MD;  Location: [facility] ENDOSCOPY;  Service: Gastroenterology   • ENDOSCOPY  02/17/2004   • HYSTERECTOMY     • OOPHORECTOMY       Social History:  reports that she quit smoking about 17 years ago. Her smoking use included cigarettes. She has never used smokeless tobacco. She reports that she does not drink alcohol and does not use drugs.    Family History: family history  includes Cancer in her brother, father, and mother; Colon cancer in her maternal aunt; Hypertension in her mother.      Allergies:  Allergies   Allergen Reactions   • Beta Adrenergic Blockers Anaphylaxis   • Tetracyclines & Related Anaphylaxis   • Monistat [Miconazole] Swelling   • Avelox [Moxifloxacin Hcl] Nausea And Vomiting   • Clindamycin/Lincomycin Nausea And Vomiting   • Demerol [Meperidine] Other (See Comments)     Paul not help   • Erythromycin Other (See Comments)     Pt states she in no longer allergic   • Flagyl [Metronidazole] Nausea And Vomiting   • Morphine And Related Other (See Comments)     Does not help   • Nsaids Other (See Comments)     reflux   • Tramadol Nausea And Vomiting     Medications:  Prior to Admission medications    Medication Sig Start Date End Date Taking? Authorizing Provider   fluticasone (FLONASE) 50 MCG/ACT nasal spray 2 sprays into the nostril(s) as directed by provider Daily. 7/11/22  Yes Laurie Tamayo DO   GuaiFENesin (MUCINEX PO) Take 1 tablet by mouth 2 (Two) Times a Day.   Yes ProviderFlorence MD   hyoscyamine sulfate (ANASPAZ) 0.125 MG tablet dispersible disintegrating tablet PLACE 1 TABLET ON THE TONGUE EVERY 6 (SIX) HOURS AS NEEDED (FOR IBS). 5/28/20  Yes Cameron Philip MD   Loratadine (CLARITIN PO) Take 1 tablet by mouth As Needed.   Yes ProviderFlorence MD   LORazepam (ATIVAN) 0.5 MG tablet Take 1 tablet by mouth As Needed for Anxiety. 3/1/22  Yes Laurie Tamayo DO   methylPREDNISolone (MEDROL) 4 MG dose pack Take as directed on package instructions. 1/9/23  Yes Meli Fernandez APRN   oxyCODONE-acetaminophen (PERCOCET) 7.5-325 MG per tablet Take 1 tablet by mouth 4 (Four) Times a Day. 11/7/17  Yes Emergency, Nurse Epic, RN   Unable to find 1 each 1 (One) Time. Collagen patch   Vitamin c   Yes ProviderFlorence MD   amoxicillin-clavulanate (Augmentin) 875-125 MG per tablet Take 1 tablet by mouth 2 (Two) Times a Day for 10 days. 1/9/23  1/19/23  Meli Fernandez APRN   omeprazole (priLOSEC) 20 MG capsule TAKE 1 CAPSULE BY MOUTH EVERY DAY 9/19/22   Laurie Tamayo,    Unable to find 1 each 1 (One) Time. Vitamin D,C,Zinc patch    Provider, MD Florence   methylPREDNISolone (MEDROL) 4 MG dose pack Take as directed on package instructions. 8/18/22 1/9/23  Meli Fernandez APRN       Objective     Vital Signs: LMP  (LMP Unknown)   Physical Exam  Patient presents via telephone visit, unable to preform physical assessment.  BMI is within normal parameters. No other follow-up for BMI required.    Results Reviewed:  Unable to review past ENT notes.     Assessment / Plan     Assessment/Plan:  Diagnoses and all orders for this visit:    1. Acute recurrent frontal sinusitis (Primary)    Other orders  -     methylPREDNISolone (MEDROL) 4 MG dose pack; Take as directed on package instructions.  Dispense: 21 tablet; Refill: 0  -     amoxicillin-clavulanate (Augmentin) 875-125 MG per tablet; Take 1 tablet by mouth 2 (Two) Times a Day for 10 days.  Dispense: 20 tablet; Refill: 0       Patient presents via telephone visit for sinus pressure. She takes claritin and flonase daily. She has tried Astelin nasal spray in the past, but states that it made her nose \"too dry.\" She is has also tried allegra and singular in the past, however she does not wish to change her antihistamine at this time. She states \"it still gets the job done.\" She also does not wish to be referred back to ENT. Previous treatment on 8/18/2022 for similar symptoms included Augmentin and a medrol dose navdeep. Will use this treatment again as patient verbalizes that this treatment was effective.  Patient to call for any worsening or no improvement of symptoms.    Keep scheduled office visit 1/25 with Dr. Plunkett,  unless patient needs to be seen sooner or acute issues arise.    I have discussed the patient results/orders and and plan/recommendation with them at today's visit.  This visit  has been rescheduled as a phone visit to comply with patient safety concerns in accordance with CDC recommendations. Total time of discussion was 6 minutes.     Meli Fernandez, APRN   01/09/2023

## 2023-01-25 ENCOUNTER — OFFICE VISIT (OUTPATIENT)
Dept: INTERNAL MEDICINE | Facility: CLINIC | Age: 68
End: 2023-01-25
Payer: MEDICARE

## 2023-01-25 VITALS
SYSTOLIC BLOOD PRESSURE: 128 MMHG | HEART RATE: 76 BPM | WEIGHT: 148 LBS | HEIGHT: 63 IN | OXYGEN SATURATION: 97 % | DIASTOLIC BLOOD PRESSURE: 82 MMHG | BODY MASS INDEX: 26.22 KG/M2 | TEMPERATURE: 97.6 F

## 2023-01-25 DIAGNOSIS — F41.9 ANXIETY: Chronic | ICD-10-CM

## 2023-01-25 DIAGNOSIS — E78.2 MIXED HYPERLIPIDEMIA: Chronic | ICD-10-CM

## 2023-01-25 DIAGNOSIS — R73.01 IFG (IMPAIRED FASTING GLUCOSE): Primary | Chronic | ICD-10-CM

## 2023-01-25 DIAGNOSIS — G89.29 CHRONIC MIDLINE LOW BACK PAIN WITH BILATERAL SCIATICA: Chronic | ICD-10-CM

## 2023-01-25 DIAGNOSIS — M54.41 CHRONIC MIDLINE LOW BACK PAIN WITH BILATERAL SCIATICA: Chronic | ICD-10-CM

## 2023-01-25 DIAGNOSIS — M54.42 CHRONIC MIDLINE LOW BACK PAIN WITH BILATERAL SCIATICA: Chronic | ICD-10-CM

## 2023-01-25 PROBLEM — R06.81 WITNESSED EPISODE OF APNEA: Status: RESOLVED | Noted: 2017-11-13 | Resolved: 2023-01-25

## 2023-01-25 PROBLEM — Z78.9 DIFFICULTY USING BIPHASIC POSITIVE AIRWAY PRESSURE (BIPAP) MACHINE: Status: RESOLVED | Noted: 2017-05-19 | Resolved: 2023-01-25

## 2023-01-25 PROBLEM — K58.2 IRRITABLE BOWEL SYNDROME WITH BOTH CONSTIPATION AND DIARRHEA: Status: RESOLVED | Noted: 2020-05-19 | Resolved: 2023-01-25

## 2023-01-25 LAB — HBA1C MFR BLD: 5.6 %

## 2023-01-25 PROCEDURE — 99214 OFFICE O/P EST MOD 30 MIN: CPT | Performed by: INTERNAL MEDICINE

## 2023-01-25 PROCEDURE — 83036 HEMOGLOBIN GLYCOSYLATED A1C: CPT | Performed by: INTERNAL MEDICINE

## 2023-01-25 PROCEDURE — 3044F HG A1C LEVEL LT 7.0%: CPT | Performed by: INTERNAL MEDICINE

## 2023-01-25 RX ORDER — BUSPIRONE HYDROCHLORIDE 5 MG/1
5 TABLET ORAL 3 TIMES DAILY PRN
Qty: 90 TABLET | Refills: 0 | Status: SHIPPED | OUTPATIENT
Start: 2023-01-25

## 2023-01-25 NOTE — PROGRESS NOTES
"      Chief Complaint  Prediabetes (A1c:  5.6) and Sinus Problem (drainage)    Subjective        Joi YANIRA Mcqueen presents to [facility]    HPI  Patient overall doing well.  No complaints.  Would like to try buspirone instead of benzos.  Dr. Tamayo had discussed at previous visit and patient agreed but medication was never sent in.      Review of Systems   Constitutional: Negative for activity change, chills and fever.   Respiratory: Negative for cough and shortness of breath.    Cardiovascular: Negative for chest pain and palpitations.   Gastrointestinal: Negative for abdominal distention, abdominal pain, diarrhea, nausea and vomiting.       Objective   Vital Signs:  /82   Pulse 76   Temp 97.6 °F (36.4 °C) (Temporal)   Ht 160 cm (63\")   Wt 67.1 kg (148 lb)   SpO2 97%   BMI 26.22 kg/m²   Estimated body mass index is 26.22 kg/m² as calculated from the following:    Height as of this encounter: 160 cm (63\").    Weight as of this encounter: 67.1 kg (148 lb).      Physical Exam  Vitals reviewed.   HENT:      Mouth/Throat:      Mouth: Mucous membranes are dry.      Pharynx: Oropharynx is clear.   Eyes:      General: No scleral icterus.     Conjunctiva/sclera: Conjunctivae normal.   Cardiovascular:      Rate and Rhythm: Normal rate and regular rhythm.   Pulmonary:      Effort: Pulmonary effort is normal. No respiratory distress.   Neurological:      General: No focal deficit present.      Mental Status: She is alert and oriented to person, place, and time.   Psychiatric:         Mood and Affect: Mood normal.         Behavior: Behavior normal.                   Assessment and Plan   Diagnoses and all orders for this visit:    1. IFG (impaired fasting glucose) (Primary)  Comments:  A1c improved.  5.6.  outside of the prediabetic range.  Orders:  -     POC Glycated Hemoglobin, Total  -     CBC w AUTO Differential; Future  -     Hemoglobin A1c; Future  -     TSH Rfx On Abnormal To " Free T4; Future  -     Lipid Panel; Future  -     Comprehensive metabolic panel; Future    2. Mixed hyperlipidemia  Comments:  Will check at next visit.  Orders:  -     CBC w AUTO Differential; Future  -     Hemoglobin A1c; Future  -     TSH Rfx On Abnormal To Free T4; Future  -     Lipid Panel; Future  -     Comprehensive metabolic panel; Future    3. Anxiety  Assessment & Plan:  Chronic issue.  Patient willing to try buspirone and come off of benzodiazepines.  Will start with 5 mg PRN.    Orders:  -     busPIRone (BUSPAR) 5 MG tablet; Take 1 tablet by mouth 3 (Three) Times a Day As Needed (anxiety).  Dispense: 90 tablet; Refill: 0    4. Chronic midline low back pain with bilateral sciatica  Assessment & Plan:  Follows with Dr. Gonzalez for chronic pain management.          Result Review :  The following data was reviewed by: Lewis Plunkett MD on 01/25/2023:  CMP    CMP 7/21/22 7/28/22   Glucose 94    BUN 11    Creatinine 0.83    Sodium 137    Potassium 5.3 (A) 5.2   Chloride 101    Calcium 9.5    Total Protein 6.6    Albumin 4.40    Globulin 2.2    Total Bilirubin 0.5    Alkaline Phosphatase 52    AST (SGOT) 19    ALT (SGPT) 15    BUN/Creatinine Ratio 13.3    (A) Abnormal value            CBC    CBC 7/21/22   WBC 5.86   RBC 4.45   Hemoglobin 13.3   Hematocrit 41.6   MCV 93.5   MCH 29.9   MCHC 32.0   RDW 12.4   Platelets 258           CBC w/diff    CBC w/Diff 7/21/22   WBC 5.86   RBC 4.45   Hemoglobin 13.3   Hematocrit 41.6   MCV 93.5   MCH 29.9   MCHC 32.0   RDW 12.4   Platelets 258   Neutrophil Rel % 52.7   Lymphocyte Rel % 37.7   Monocyte Rel % 7.2   Eosinophil Rel % 1.7   Basophil Rel % 0.5           Lipid Panel    Lipid Panel 7/21/22   Total Cholesterol 216 (A)   Triglycerides 121   HDL Cholesterol 63 (A)   VLDL Cholesterol 21   LDL Cholesterol  132 (A)   (A) Abnormal value       Comments are available for some flowsheets but are not being displayed.           TSH    TSH 7/21/22   TSH 0.795                            Follow Up   Return in about 6 months (around 7/25/2023), or if symptoms worsen or fail to improve, for Recheck.  Patient was given instructions and counseling regarding her condition or for health maintenance advice. Please see specific information pulled into the AVS if appropriate.       LENNY Plunkett MD, FACP, [facility]      Electronically signed by Lewis Plunkett MD, 01/25/23, 11:02 AM CST.

## 2023-01-26 NOTE — ASSESSMENT & PLAN NOTE
Chronic issue.  Patient willing to try buspirone and come off of benzodiazepines.  Will start with 5 mg PRN.

## 2023-03-20 RX ORDER — OMEPRAZOLE 20 MG/1
CAPSULE, DELAYED RELEASE ORAL
Qty: 90 CAPSULE | Refills: 1 | Status: SHIPPED | OUTPATIENT
Start: 2023-03-20

## 2023-04-24 DIAGNOSIS — F41.9 ANXIETY: ICD-10-CM

## 2023-04-24 RX ORDER — FLUTICASONE PROPIONATE 50 MCG
SPRAY, SUSPENSION (ML) NASAL
Qty: 48 ML | Refills: 3 | Status: SHIPPED | OUTPATIENT
Start: 2023-04-24

## 2023-04-24 RX ORDER — LORAZEPAM 0.5 MG/1
0.5 TABLET ORAL AS NEEDED
Qty: 20 TABLET | Refills: 0 | Status: SHIPPED | OUTPATIENT
Start: 2023-04-24

## 2023-04-28 ENCOUNTER — TELEPHONE (OUTPATIENT)
Dept: INTERNAL MEDICINE | Facility: CLINIC | Age: 68
End: 2023-04-28

## 2023-04-28 NOTE — TELEPHONE ENCOUNTER
Caller: Joi Mcqueen A    Relationship: Self    Best call back number: [phone]    What is the best time to reach you: ANYTIME    Who are you requesting to speak with (clinical staff, provider,  specific staff member): CLININCAL    What was the call regarding: CALLED AND STATED THAT PHARMACY CONTACTED HER REGARDING HER LORAZEPAM AND STATED THAT THEY NEEDED MORE INFORMATION FOR THE PRESCRIPTION.    Do you require a callback: NO

## 2023-04-28 NOTE — TELEPHONE ENCOUNTER
Stacia at [facility] said that it needs a prior auth and she would send it through cover my meds.

## 2023-06-21 PROBLEM — Z80.0 FAMILY HX OF COLON CANCER: Status: ACTIVE | Noted: 2023-06-21

## 2023-08-09 ENCOUNTER — ANESTHESIA (OUTPATIENT)
Dept: GASTROENTEROLOGY | Facility: HOSPITAL | Age: 68
End: 2023-08-09
Payer: MEDICARE

## 2023-08-09 ENCOUNTER — HOSPITAL ENCOUNTER (OUTPATIENT)
Facility: HOSPITAL | Age: 68
Setting detail: HOSPITAL OUTPATIENT SURGERY
Discharge: HOME | End: 2023-08-09
Attending: INTERNAL MEDICINE | Admitting: INTERNAL MEDICINE
Payer: MEDICARE

## 2023-08-09 ENCOUNTER — ANESTHESIA EVENT (OUTPATIENT)
Dept: GASTROENTEROLOGY | Facility: HOSPITAL | Age: 68
End: 2023-08-09
Payer: MEDICARE

## 2023-08-09 VITALS
SYSTOLIC BLOOD PRESSURE: 104 MMHG | RESPIRATION RATE: 11 BRPM | BODY MASS INDEX: 25.16 KG/M2 | WEIGHT: 142 LBS | TEMPERATURE: 96.5 F | DIASTOLIC BLOOD PRESSURE: 57 MMHG | HEART RATE: 89 BPM | HEIGHT: 63 IN | OXYGEN SATURATION: 96 %

## 2023-08-09 DIAGNOSIS — Z86.010 HISTORY OF ADENOMATOUS POLYP OF COLON: ICD-10-CM

## 2023-08-09 DIAGNOSIS — Z80.0 FAMILY HX OF COLON CANCER: ICD-10-CM

## 2023-08-09 PROCEDURE — 45385 COLONOSCOPY W/LESION REMOVAL: CPT | Performed by: INTERNAL MEDICINE

## 2023-08-09 PROCEDURE — 25010000002 PROPOFOL 10 MG/ML EMULSION: Performed by: NURSE ANESTHETIST, CERTIFIED REGISTERED

## 2023-08-09 PROCEDURE — 88305 TISSUE EXAM BY PATHOLOGIST: CPT | Performed by: INTERNAL MEDICINE

## 2023-08-09 RX ORDER — LIDOCAINE HYDROCHLORIDE 10 MG/ML
0.5 INJECTION, SOLUTION EPIDURAL; INFILTRATION; INTRACAUDAL; PERINEURAL ONCE AS NEEDED
Status: DISCONTINUED | OUTPATIENT
Start: 2023-08-09 | End: 2023-08-09 | Stop reason: HOSPADM

## 2023-08-09 RX ORDER — PROPOFOL 10 MG/ML
VIAL (ML) INTRAVENOUS AS NEEDED
Status: DISCONTINUED | OUTPATIENT
Start: 2023-08-09 | End: 2023-08-09 | Stop reason: SURG

## 2023-08-09 RX ORDER — SODIUM CHLORIDE 9 MG/ML
500 INJECTION, SOLUTION INTRAVENOUS CONTINUOUS PRN
Status: DISCONTINUED | OUTPATIENT
Start: 2023-08-09 | End: 2023-08-09 | Stop reason: HOSPADM

## 2023-08-09 RX ORDER — ONDANSETRON 2 MG/ML
4 INJECTION INTRAMUSCULAR; INTRAVENOUS ONCE AS NEEDED
Status: DISCONTINUED | OUTPATIENT
Start: 2023-08-09 | End: 2023-08-09 | Stop reason: HOSPADM

## 2023-08-09 RX ORDER — LIDOCAINE HYDROCHLORIDE 20 MG/ML
INJECTION, SOLUTION EPIDURAL; INFILTRATION; INTRACAUDAL; PERINEURAL AS NEEDED
Status: DISCONTINUED | OUTPATIENT
Start: 2023-08-09 | End: 2023-08-09 | Stop reason: SURG

## 2023-08-09 RX ORDER — SODIUM CHLORIDE 0.9 % (FLUSH) 0.9 %
10 SYRINGE (ML) INJECTION AS NEEDED
Status: DISCONTINUED | OUTPATIENT
Start: 2023-08-09 | End: 2023-08-09 | Stop reason: HOSPADM

## 2023-08-09 RX ADMIN — PROPOFOL INJECTABLE EMULSION 500 MG: 10 INJECTION, EMULSION INTRAVENOUS at 09:08

## 2023-08-09 RX ADMIN — LIDOCAINE HYDROCHLORIDE 100 MG: 20 INJECTION, SOLUTION EPIDURAL; INFILTRATION; INTRACAUDAL; PERINEURAL at 09:08

## 2023-08-09 RX ADMIN — SODIUM CHLORIDE 500 ML: 9 INJECTION, SOLUTION INTRAVENOUS at 08:48

## 2023-08-09 NOTE — ANESTHESIA POSTPROCEDURE EVALUATION
Patient: Joi Mcqueen    Procedure Summary       Date: 08/09/23 Room / Location: [facility] ENDOSCOPY 4 / [facility] ENDOSCOPY    Anesthesia Start: 0905 Anesthesia Stop: 0932    Procedure: COLONOSCOPY WITH ANESTHESIA Diagnosis:       History of adenomatous polyp of colon      Family hx of colon cancer      (History of adenomatous polyp of colon [Z86.010])      (Family hx of colon cancer [Z80.0])    Surgeons: Jed Lutz MD Provider: LENNY Harman CRNA    Anesthesia Type: MAC ASA Status: 2            Anesthesia Type: MAC    Vitals  Vitals Value Taken Time   /59 08/09/23 0934   Temp     Pulse 89 08/09/23 0935   Resp 12 08/09/23 0934   SpO2 96 % 08/09/23 0935   Vitals shown include unvalidated device data.        Post Anesthesia Care and Evaluation    Patient location during evaluation: PACU  Patient participation: complete - patient participated  Level of consciousness: awake and alert  Pain score: 0  Pain management: adequate    Airway patency: patent  Anesthetic complications: No anesthetic complications    Cardiovascular status: acceptable and stable  Respiratory status: acceptable and unassisted  Hydration status: acceptable

## 2023-08-09 NOTE — H&P
[facility] Gastroenterology  Pre Procedure History & Physical    Chief Complaint:   Colon polyps    Subjective     HPI:   The patient has a history of colon polyps who presents for exam.    Past Medical History:   Past Medical History:   Diagnosis Date    Chronic back pain     Chronic neck pain     Claustrophobia     Colon polyp     DDD (degenerative disc disease), lumbar     GERD (gastroesophageal reflux disease)     Hyperlipidemia     Hyperlipidemia     IBS (irritable bowel syndrome)     Low back pain     Peptic ulcer     Sleep apnea        Past Surgical History:  Past Surgical History:   Procedure Laterality Date    BLADDER SUSPENSION      CATARACT EXTRACTION Bilateral     COLONOSCOPY  04/16/2015    COLONOSCOPY N/A 5/31/2018    Procedure: COLONOSCOPY WITH ANESTHESIA;  Surgeon: Jed Lutz MD;  Location: [facility] ENDOSCOPY;  Service: Gastroenterology    ENDOSCOPY  02/17/2004    HYSTERECTOMY      OOPHORECTOMY         Family History:  Family History   Problem Relation Age of Onset    Hypertension Mother     Cancer Mother         lung    Cancer Father         leukemia    Cancer Brother         cancer caused by asbestos    Colon cancer Maternal Aunt     Colon polyps Neg Hx     Breast cancer Neg Hx     Ovarian cancer Neg Hx     Uterine cancer Neg Hx        Social History:   reports that she quit smoking about 17 years ago. Her smoking use included cigarettes. She has never used smokeless tobacco. She reports that she does not drink alcohol and does not use drugs.    Medications:   Prior to Admission medications    Medication Sig Start Date End Date Taking? Authorizing Provider   fluticasone (FLONASE) 50 MCG/ACT nasal spray SPRAY 2 SPRAYS INTO THE NOSTRIL AS DIRECTED BY PROVIDER DAILY. 4/24/23  Yes Lewis Plunkett MD   GuaiFENesin (MUCINEX PO) Take 1 tablet by mouth 2 (Two) Times a Day.   Yes Provider, MD Florence   LORazepam (ATIVAN) 0.5 MG tablet TAKE 1 TABLET BY MOUTH AS NEEDED FOR ANXIETY 4/24/23  Yes  "Lewis Plunkett MD   omeprazole (priLOSEC) 20 MG capsule TAKE 1 CAPSULE BY MOUTH EVERY DAY 3/20/23  Yes Laurie Tamayo   oxyCODONE-acetaminophen (PERCOCET) 7.5-325 MG per tablet Take 1 tablet by mouth 4 (Four) Times a Day. 11/7/17  Yes Emergency, Nurse Blu, RN   busPIRone (BUSPAR) 5 MG tablet Take 1 tablet by mouth 3 (Three) Times a Day As Needed (anxiety).  Patient not taking: Reported on 6/21/2023 1/25/23   Lewis Plunkett MD   hyoscyamine sulfate (ANASPAZ) 0.125 MG tablet dispersible disintegrating tablet PLACE 1 TABLET ON THE TONGUE EVERY 6 (SIX) HOURS AS NEEDED (FOR IBS). 5/28/20   Cameron Philip MD   Loratadine (CLARITIN PO) Take 1 tablet by mouth As Needed.    ProviderFlorence MD   Omega-3 Fatty Acids (OMEGA 3 PO) Take  by mouth Every Other Day.    ProviderFlorence MD   Unable to find 1 each 1 (One) Time. Vitamin D,C,Zinc patch    ProviderFlorence MD   Unable to find 1 each 1 (One) Time. Collagen patch   Vitamin c    ProviderFlorence MD       Allergies:  Beta adrenergic blockers, Tetracyclines & related, Monistat [miconazole], Avelox [moxifloxacin hcl], Clindamycin/lincomycin, Demerol [meperidine], Flagyl [metronidazole], Morphine and related, Nsaids, and Tramadol    ROS:    General: Weight stable  Resp: No SOA  Cardiovascular: No CP    Objective     Blood pressure 160/70, pulse 83, temperature 96.5 øF (35.8 øC), temperature source Temporal, resp. rate 18, height 160 cm (63\"), weight 64.4 kg (142 lb), SpO2 98 %, not currently breastfeeding.    Physical Exam   Constitutional: Pt is oriented to person, place, and in no distress.   Cardiovascular: Normal rate, regular rhythm.    Pulmonary/Chest: Effort normal. No respiratory distress.   Abdominal:  Non-distended.  Psychiatric: Mood, memory, affect and judgment appear normal.     Assessment & Plan     Diagnosis:  Colon polyps    Anticipated Surgical Procedure:  Colonoscopy    The risks, benefits, and alternatives of this " procedure have been discussed with the patient or the responsible party- the patient understands and agrees to proceed.      EMR Dragon/transcription disclaimer:  Much of this encounter note is electronic transcription/translation of spoken language to printed text.  The electronic translation of spoken language may be erroneous, or at times, nonsensical words or phrases may be inadvertently transcribed.  Although I have reviewed the note for such errors, some may still exist.

## 2023-08-09 NOTE — ANESTHESIA PREPROCEDURE EVALUATION
Anesthesia Evaluation     Patient summary reviewed   no history of anesthetic complications:   NPO Solid Status: > 8 hours  NPO Liquid Status: > 2 hours           Airway   Mallampati: II  TM distance: >3 FB  Neck ROM: full  No difficulty expected  Dental - normal exam     Pulmonary    (+) ,sleep apnea on CPAP  (-) asthma, not a smoker  Cardiovascular   Exercise tolerance: good (4-7 METS)    (-) hypertension, past MI, dysrhythmias, cardiac stents, hyperlipidemia      Neuro/Psych  (-) seizures, TIA, CVA  GI/Hepatic/Renal/Endo    (+) GERD  (-) liver disease, no renal disease, diabetes    Musculoskeletal     Abdominal    Substance History      OB/GYN          Other                      Anesthesia Plan    ASA 2     MAC       Anesthetic plan, risks, benefits, and alternatives have been provided, discussed and informed consent has been obtained with: patient.    CODE STATUS:

## 2023-08-11 LAB
LAB AP CASE REPORT: NORMAL
Lab: NORMAL
PATH REPORT.FINAL DX SPEC: NORMAL
PATH REPORT.GROSS SPEC: NORMAL

## 2023-08-17 ENCOUNTER — OFFICE VISIT (OUTPATIENT)
Dept: INTERNAL MEDICINE | Facility: CLINIC | Age: 68
End: 2023-08-17
Payer: MEDICARE

## 2023-08-17 VITALS
OXYGEN SATURATION: 100 % | SYSTOLIC BLOOD PRESSURE: 126 MMHG | HEIGHT: 63 IN | BODY MASS INDEX: 25.3 KG/M2 | WEIGHT: 142.8 LBS | DIASTOLIC BLOOD PRESSURE: 72 MMHG | HEART RATE: 60 BPM | TEMPERATURE: 97.3 F

## 2023-08-17 DIAGNOSIS — Z12.31 ENCOUNTER FOR SCREENING MAMMOGRAM FOR BREAST CANCER: Primary | ICD-10-CM

## 2023-08-17 DIAGNOSIS — F41.9 ANXIETY: Chronic | ICD-10-CM

## 2023-08-17 DIAGNOSIS — E78.2 MIXED HYPERLIPIDEMIA: ICD-10-CM

## 2023-08-17 DIAGNOSIS — M54.42 CHRONIC MIDLINE LOW BACK PAIN WITH BILATERAL SCIATICA: Chronic | ICD-10-CM

## 2023-08-17 DIAGNOSIS — K59.09 CHRONIC CONSTIPATION: ICD-10-CM

## 2023-08-17 DIAGNOSIS — G89.29 CHRONIC MIDLINE LOW BACK PAIN WITH BILATERAL SCIATICA: Chronic | ICD-10-CM

## 2023-08-17 DIAGNOSIS — M54.41 CHRONIC MIDLINE LOW BACK PAIN WITH BILATERAL SCIATICA: Chronic | ICD-10-CM

## 2023-08-17 DIAGNOSIS — K21.9 GASTROESOPHAGEAL REFLUX DISEASE WITHOUT ESOPHAGITIS: ICD-10-CM

## 2023-08-17 DIAGNOSIS — G47.33 OBSTRUCTIVE SLEEP APNEA: ICD-10-CM

## 2023-08-17 DIAGNOSIS — R73.01 IFG (IMPAIRED FASTING GLUCOSE): ICD-10-CM

## 2023-08-17 DIAGNOSIS — Z78.0 POST-MENOPAUSAL: ICD-10-CM

## 2023-08-17 RX ORDER — BUSPIRONE HYDROCHLORIDE 5 MG/1
5 TABLET ORAL 3 TIMES DAILY PRN
Qty: 90 TABLET | Refills: 2 | Status: SHIPPED | OUTPATIENT
Start: 2023-08-17

## 2023-08-18 NOTE — PROGRESS NOTES
The ABCs of the Annual Wellness Visit  Subsequent Medicare Wellness Visit    Chief Complaint   Patient presents with    Medicare Wellness-subsequent     Labs printed     Discuss Medication     Patient would like a refill on Buspar and continue with that instead of Ativan.   States she has not been able to get a refill of Buspar.       Subjective    History of Present Illness:  Joi Mcqueen is a 68 y.o. female who presents for a Subsequent Medicare Wellness Visit.    The following portions of the patient's history were reviewed and   updated as appropriate: allergies, current medications, past family history, past medical history, past social history, past surgical history and problem list.    Compared to one year ago, the patient feels her physical   health is the same.    Compared to one year ago, the patient feels her mental   health is the same.    Recent Hospitalizations:  She was not admitted to the hospital during the last year.       Current Medical Providers:  Patient Care Team:  Lewis Plunkett MD as PCP - General (Internal Medicine)  Jed Lutz MD as Consulting Physician (Gastroenterology)    Outpatient Medications Prior to Visit   Medication Sig Dispense Refill    fluticasone (FLONASE) 50 MCG/ACT nasal spray SPRAY 2 SPRAYS INTO THE NOSTRIL AS DIRECTED BY PROVIDER DAILY. 48 mL 3    GuaiFENesin (MUCINEX PO) Take 1 tablet by mouth 2 (Two) Times a Day.      Loratadine (CLARITIN PO) Take 1 tablet by mouth As Needed.      LORazepam (ATIVAN) 0.5 MG tablet TAKE 1 TABLET BY MOUTH AS NEEDED FOR ANXIETY 20 tablet 0    Omega-3 Fatty Acids (OMEGA 3 PO) Take  by mouth Every Other Day.      omeprazole (priLOSEC) 20 MG capsule TAKE 1 CAPSULE BY MOUTH EVERY DAY 90 capsule 1    oxyCODONE-acetaminophen (PERCOCET) 7.5-325 MG per tablet Take 1 tablet by mouth 4 (Four) Times a Day.      Unable to find 1 each 1 (One) Time. Vitamin D,C,Zinc patch      Unable to find 1 each 1 (One) Time. Collagen patch  "  Vitamin c      hyoscyamine sulfate (ANASPAZ) 0.125 MG tablet dispersible disintegrating tablet PLACE 1 TABLET ON THE TONGUE EVERY 6 (SIX) HOURS AS NEEDED (FOR IBS). 60 tablet 1    busPIRone (BUSPAR) 5 MG tablet Take 1 tablet by mouth 3 (Three) Times a Day As Needed (anxiety). (Patient not taking: Reported on 8/17/2023) 90 tablet 0     No facility-administered medications prior to visit.       Opioid medication/s are on active medication list.  and I have evaluated her active treatment plan and pain score trends (see table).  Vitals:    08/17/23 1107   PainSc:   5   PainLoc: Neck     I have reviewed the chart for potential of high risk medication and harmful drug interactions in the elderly.          Aspirin is not on active medication list.  Aspirin use is not indicated based on review of current medical condition/s. Risk of harm outweighs potential benefits.  .    Patient Active Problem List   Diagnosis    DDD (degenerative disc disease), lumbar    Chronic midline low back pain with bilateral sciatica    Lumbar radiculopathy    Chronic constipation    Hx of colonic polyp    Obstructive sleep apnea    Other insomnia    GERD (gastroesophageal reflux disease)    Hyperlipidemia    Cervical disc disease    BiPAP (biphasic positive airway pressure) dependence    IFG (impaired fasting glucose)    Anxiety    Family hx of colon cancer     Advance Care Planning  Advance Directive is not on file.  ACP discussion was held with the patient during this visit. Patient does not have an advance directive, information provided.       Objective    Vitals:    08/17/23 1107   BP: 126/72   BP Location: Right arm   Patient Position: Sitting   Cuff Size: Adult   Pulse: 60   Temp: 97.3 øF (36.3 øC)   TempSrc: Temporal   SpO2: 100%   Weight: 64.8 kg (142 lb 12.8 oz)   Height: 160 cm (63\")   PainSc:   5   PainLoc: Neck     Estimated body mass index is 25.3 kg/mý as calculated from the following:    Height as of this encounter: 160 cm " "(63\").    Weight as of this encounter: 64.8 kg (142 lb 12.8 oz).    BMI is >= 25 and <30. (Overweight) The following options were offered after discussion;: none (medical contraindication)      Does the patient have evidence of cognitive impairment? No      Lab Results   Component Value Date    CHLPL 234 (H) 2023    TRIG 104 2023    HDL 64 (H) 2023     (H) 2023    VLDL 18 2023    HGBA1C 6.00 (H) 2023            HEALTH RISK ASSESSMENT    Smoking Status:  Social History     Tobacco Use   Smoking Status Former    Types: Cigarettes    Quit date:     Years since quittin.6   Smokeless Tobacco Never     Alcohol Consumption:  Social History     Substance and Sexual Activity   Alcohol Use No     Fall Risk Screen:    MANUEL Fall Risk Assessment was completed, and patient is at LOW risk for falls.Assessment completed on:2023    Depression Screenin/17/2023    11:11 AM   PHQ-2/PHQ-9 Depression Screening   Little Interest or Pleasure in Doing Things 0-->not at all   Feeling Down, Depressed or Hopeless 0-->not at all   PHQ-9: Brief Depression Severity Measure Score 0       Health Habits and Functional and Cognitive Screenin/17/2023    11:13 AM   Functional & Cognitive Status   Do you have difficulty preparing food and eating? No   Do you have difficulty bathing yourself, getting dressed or grooming yourself? No   Do you have difficulty using the toilet? No   Do you have difficulty moving around from place to place? No   Do you have trouble with steps or getting out of a bed or a chair? No   Current Diet Well Balanced Diet   Dental Exam Up to date   Eye Exam Up to date   Exercise (times per week) 3 times per week   Current Exercises Include Walking;Yard Work   Do you need help using the phone?  No   Are you deaf or do you have serious difficulty hearing?  No   Do you need help to go to places out of walking distance? No   Do you need help shopping? No   Do " you need help preparing meals?  No   Do you need help with housework?  No   Do you need help with laundry? No   Do you need help taking your medications? No   Do you need help managing money? No   Do you ever drive or ride in a car without wearing a seat belt? No   Have you felt unusual stress, anger or loneliness in the last month? No   Who do you live with? Alone   If you need help, do you have trouble finding someone available to you? No   Have you been bothered in the last four weeks by sexual problems? No   Do you have difficulty concentrating, remembering or making decisions? No       Age-appropriate Screening Schedule:  Refer to the list below for future screening recommendations based on patient's age, sex and/or medical conditions. Orders for these recommended tests are listed in the plan section. The patient has been provided with a written plan.    Health Maintenance   Topic Date Due    DXA SCAN  06/14/2023    ANNUAL WELLNESS VISIT  07/28/2023    COVID-19 Vaccine (1) 12/01/2023 (Originally 1955)    Pneumococcal Vaccine 65+ (1 - PCV) 01/25/2024 (Originally 3/21/2019)    INFLUENZA VACCINE  10/01/2023    MAMMOGRAM  08/02/2024    LIPID PANEL  08/14/2024    COLORECTAL CANCER SCREENING  08/09/2028    HEPATITIS C SCREENING  Completed    PAP SMEAR  Discontinued    TDAP/TD VACCINES  Discontinued    ZOSTER VACCINE  Discontinued              Assessment & Plan   CMS Preventative Services Quick Reference  Risk Factors Identified During Encounter  Immunizations Discussed/Encouraged: Influenza and COVID19  Inactivity/Sedentary: Patient was advised to exercise at least 150 minutes a week per CDC recommendations.  Dental Screening Recommended  Vision Screening Recommended  The above risks/problems have been discussed with the patient.  Follow up actions/plans if indicated are seen below in the Assessment/Plan Section.  Pertinent information has been shared with the patient in the After Visit Summary.    Diagnoses and  "all orders for this visit:    1. Encounter for screening mammogram for breast cancer (Primary)  -     Mammo Screening Digital Tomosynthesis Bilateral With CAD; Future    2. Anxiety  -     busPIRone (BUSPAR) 5 MG tablet; Take 1 tablet by mouth 3 (Three) Times a Day As Needed (anxiety).  Dispense: 90 tablet; Refill: 2    3. Post-menopausal  -     DEXA Bone Density Axial; Future    4. Mixed hyperlipidemia    5. IFG (impaired fasting glucose)    6. Gastroesophageal reflux disease without esophagitis    7. Chronic constipation    8. Chronic midline low back pain with bilateral sciatica    9. Obstructive sleep apnea        Patient up today most of her screenings.  We will catch her up from any that she has not had.  Discussed upcoming influenza 19 vaccines.      Result Review :           Follow Up:   Return in about 6 months (around 2/17/2024), or if symptoms worsen or fail to improve, for Recheck.     An After Visit Summary and PPPS were made available to the patient.                         LENNY Plunkett MD, FACP, [facility]      Electronically signed by Lewis Plunkett MD, 08/18/23, 4:17 PM CDT.    Additional E&M Note during same encounter follows:  Patient has multiple medical problems which are significant and separately identifiable that require additional work above and beyond the Medicare Wellness Visit.      Chief Complaint  Medicare Wellness-subsequent (Labs printed ) and Discuss Medication (Patient would like a refill on Buspar and continue with that instead of Ativan. /States she has not been able to get a refill of Buspar. )    Subjective        HPI  Joi A Inocentenatha is also being seen today for anxiety.        Objective   Vitals:    08/17/23 1107   BP: 126/72   BP Location: Right arm   Patient Position: Sitting   Cuff Size: Adult   Pulse: 60   Temp: 97.3 øF (36.3 øC)   TempSrc: Temporal   SpO2: 100%   Weight: 64.8 kg (142 lb 12.8 oz)   Height: 160 cm (63\")   PainSc:   5   PainLoc: Neck     Physical " Exam  Vitals reviewed.   Eyes:      General: No scleral icterus.     Conjunctiva/sclera: Conjunctivae normal.   Cardiovascular:      Rate and Rhythm: Normal rate and regular rhythm.   Pulmonary:      Effort: Pulmonary effort is normal. No respiratory distress.   Skin:     General: Skin is warm and dry.   Neurological:      General: No focal deficit present.      Mental Status: She is alert and oriented to person, place, and time.   Psychiatric:         Mood and Affect: Mood normal.         Behavior: Behavior normal.         The following data was reviewed by: Lewis Plunkett MD on 08/17/2023:  CMP          8/14/2023    07:51   CMP   Glucose 100    BUN 12    Creatinine 0.83    Sodium 142    Potassium 4.6    Chloride 103    Calcium 9.9    Total Protein 6.9    Albumin 4.7    Globulin 2.2    Total Bilirubin 0.6    Alkaline Phosphatase 64    AST (SGOT) 14    ALT (SGPT) 14    BUN/Creatinine Ratio 14.5      CBC w/diff          8/14/2023    07:51   CBC w/Diff   WBC 5.07    RBC 4.59    Hemoglobin 13.8    Hematocrit 41.9    MCV 91.3    MCH 30.1    MCHC 32.9    RDW 12.9    Platelets 282    Neutrophil Rel % 57.4    Lymphocyte Rel % 33.1    Monocyte Rel % 6.9    Eosinophil Rel % 1.8    Basophil Rel % 0.6      Lipid Panel          8/14/2023    07:51   Lipid Panel   Total Cholesterol 234    Triglycerides 104    HDL Cholesterol 64    VLDL Cholesterol 18    LDL Cholesterol  152      TSH          8/14/2023    07:51   TSH   TSH 0.827      A1C Last 3 Results          1/25/2023    11:01 8/14/2023    07:51   HGBA1C Last 3 Results   Hemoglobin A1C 5.6  6.00                     Assessment and Plan   Diagnoses and all orders for this visit:    1. Encounter for screening mammogram for breast cancer (Primary)  -     Mammo Screening Digital Tomosynthesis Bilateral With CAD; Future    2. Anxiety  -     busPIRone (BUSPAR) 5 MG tablet; Take 1 tablet by mouth 3 (Three) Times a Day As Needed (anxiety).  Dispense: 90 tablet; Refill: 2    3.  Post-menopausal  -     DEXA Bone Density Axial; Future    4. Mixed hyperlipidemia    5. IFG (impaired fasting glucose)    6. Gastroesophageal reflux disease without esophagitis    7. Chronic constipation    8. Chronic midline low back pain with bilateral sciatica    9. Obstructive sleep apnea      For the patient's anxiety, she indicates that she would like to try to stay on the buspirone, and try to avoid using the Ativan long-term.  We will send in a refill of the buspirone.  Patient tolerating it well without any significant issues.  This medication can be taken PRN or scheduled.  We have room on dose if we need to adjust as well.      For the patient's chronic constipation, we discussed different strategies.  Patient is currently doing a little bit better.  No changes in the current regimen.  Reviewed labs with the patient in detail.         Follow Up   Return in about 6 months (around 2/17/2024), or if symptoms worsen or fail to improve, for Recheck.  Patient was given instructions and counseling regarding her condition or for health maintenance advice. Please see specific information pulled into the AVS if appropriate.

## 2023-08-29 ENCOUNTER — HOSPITAL ENCOUNTER (OUTPATIENT)
Dept: BONE DENSITY | Facility: HOSPITAL | Age: 68
Discharge: HOME | End: 2023-08-29
Payer: MEDICARE

## 2023-08-29 ENCOUNTER — HOSPITAL ENCOUNTER (OUTPATIENT)
Dept: MAMMOGRAPHY | Facility: HOSPITAL | Age: 68
Discharge: HOME | End: 2023-08-29
Payer: MEDICARE

## 2023-08-29 DIAGNOSIS — Z12.31 ENCOUNTER FOR SCREENING MAMMOGRAM FOR BREAST CANCER: ICD-10-CM

## 2023-08-29 DIAGNOSIS — Z78.0 POST-MENOPAUSAL: ICD-10-CM

## 2023-08-29 PROCEDURE — 77063 BREAST TOMOSYNTHESIS BI: CPT

## 2023-08-29 PROCEDURE — 77080 DXA BONE DENSITY AXIAL: CPT

## 2023-08-29 PROCEDURE — 77067 SCR MAMMO BI INCL CAD: CPT

## 2023-09-15 RX ORDER — OMEPRAZOLE 20 MG/1
CAPSULE, DELAYED RELEASE ORAL
Qty: 90 CAPSULE | Refills: 3 | Status: SHIPPED | OUTPATIENT
Start: 2023-09-15

## 2023-11-10 DIAGNOSIS — F41.9 ANXIETY: Chronic | ICD-10-CM

## 2023-11-10 RX ORDER — BUSPIRONE HYDROCHLORIDE 5 MG/1
5 TABLET ORAL 3 TIMES DAILY PRN
Qty: 90 TABLET | Refills: 2 | Status: SHIPPED | OUTPATIENT
Start: 2023-11-10

## 2023-12-06 ENCOUNTER — OFFICE VISIT (OUTPATIENT)
Dept: INTERNAL MEDICINE | Facility: CLINIC | Age: 68
End: 2023-12-06
Payer: MEDICARE

## 2023-12-06 VITALS
OXYGEN SATURATION: 100 % | WEIGHT: 144.4 LBS | DIASTOLIC BLOOD PRESSURE: 84 MMHG | SYSTOLIC BLOOD PRESSURE: 138 MMHG | HEIGHT: 63 IN | BODY MASS INDEX: 25.59 KG/M2 | HEART RATE: 81 BPM | TEMPERATURE: 97.9 F

## 2023-12-06 DIAGNOSIS — J01.40 ACUTE NON-RECURRENT PANSINUSITIS: Primary | ICD-10-CM

## 2023-12-06 PROCEDURE — 1160F RVW MEDS BY RX/DR IN RCRD: CPT | Performed by: NURSE PRACTITIONER

## 2023-12-06 PROCEDURE — 1159F MED LIST DOCD IN RCRD: CPT | Performed by: NURSE PRACTITIONER

## 2023-12-06 PROCEDURE — 99213 OFFICE O/P EST LOW 20 MIN: CPT | Performed by: NURSE PRACTITIONER

## 2023-12-06 RX ORDER — AMOXICILLIN AND CLAVULANATE POTASSIUM 875; 125 MG/1; MG/1
1 TABLET, FILM COATED ORAL 2 TIMES DAILY
Qty: 20 TABLET | Refills: 0 | Status: SHIPPED | OUTPATIENT
Start: 2023-12-06 | End: 2023-12-16

## 2023-12-06 NOTE — PROGRESS NOTES
Subjective     Chief Complaint:  Sinus pressure, scratchy throat    HPI:  Patient presents to the office today with 1 month of waxing and waning symptoms of sinus drainage/pressure and facial pain.  She has been taking Claritin and Flonase regularly and has been using Mucinex and a Neti pot without relief of symptoms.  Please see assessment and plan below.    Patient's PMR from outside medical facility reviewed and noted.    Past Medical History:   Past Medical History:   Diagnosis Date    Chronic back pain     Chronic neck pain     Claustrophobia     Colon polyp     DDD (degenerative disc disease), lumbar     GERD (gastroesophageal reflux disease)     Hyperlipidemia     Hyperlipidemia     IBS (irritable bowel syndrome)     Low back pain     Peptic ulcer     Sleep apnea      Past Surgical History:  Past Surgical History:   Procedure Laterality Date    BLADDER SUSPENSION      CATARACT EXTRACTION Bilateral     COLONOSCOPY  04/16/2015    COLONOSCOPY N/A 5/31/2018    Procedure: COLONOSCOPY WITH ANESTHESIA;  Surgeon: Jed Lutz MD;  Location: [facility] ENDOSCOPY;  Service: Gastroenterology    COLONOSCOPY N/A 8/9/2023    Procedure: COLONOSCOPY WITH ANESTHESIA;  Surgeon: Jed Lutz MD;  Location: [facility] ENDOSCOPY;  Service: Gastroenterology;  Laterality: N/A;  pre hx colon polyp  post polyp  dr jack delgadillo    ENDOSCOPY  02/17/2004    HYSTERECTOMY      OOPHORECTOMY       Social History:  reports that she quit smoking about 17 years ago. Her smoking use included cigarettes. She has never used smokeless tobacco. She reports that she does not drink alcohol and does not use drugs.    Family History: family history includes Cancer in her brother, father, and mother; Colon cancer in her maternal aunt; Hypertension in her mother.      Allergies:  Allergies   Allergen Reactions    Beta Adrenergic Blockers Shortness Of Breath    Tetracyclines & Related Anaphylaxis    Monistat [Miconazole] Swelling    Avelox  [Moxifloxacin Hcl] Nausea And Vomiting    Clindamycin/Lincomycin Nausea And Vomiting    Demerol [Meperidine] Other (See Comments)     Paul not help    Flagyl [Metronidazole] Nausea And Vomiting    Morphine And Related Other (See Comments)     Does not help    Nsaids Other (See Comments)     reflux    Tramadol Nausea And Vomiting     Medications:  Prior to Admission medications    Medication Sig Start Date End Date Taking? Authorizing Provider   fluticasone (FLONASE) 50 MCG/ACT nasal spray SPRAY 2 SPRAYS INTO THE NOSTRIL AS DIRECTED BY PROVIDER DAILY. 4/24/23  Yes Lewis Plunkett MD   GuaiFENesin (MUCINEX PO) Take 1 tablet by mouth 2 (Two) Times a Day.   Yes Florence Brandon MD   hyoscyamine sulfate (ANASPAZ) 0.125 MG tablet dispersible disintegrating tablet PLACE 1 TABLET ON THE TONGUE EVERY 6 (SIX) HOURS AS NEEDED (FOR IBS). 5/28/20  Yes Cameron Philip MD   Loratadine (CLARITIN PO) Take 1 tablet by mouth As Needed.   Yes Florence Brandon MD   LORazepam (ATIVAN) 0.5 MG tablet TAKE 1 TABLET BY MOUTH AS NEEDED FOR ANXIETY 4/24/23  Yes Lewis Plunkett MD   Omega-3 Fatty Acids (OMEGA 3 PO) Take  by mouth Every Other Day.   Yes Florence Brandon MD   omeprazole (priLOSEC) 20 MG capsule TAKE 1 CAPSULE BY MOUTH EVERY DAY 9/15/23  Yes Lewis Plunkett MD   oxyCODONE-acetaminophen (PERCOCET) 7.5-325 MG per tablet Take 1 tablet by mouth 4 (Four) Times a Day. 11/7/17  Yes Emergency, Nurse Epic, RN   Unable to find 1 each 1 (One) Time. Vitamin D,C,Zinc patch   Yes Florence Brandon MD   Unable to find 1 each 1 (One) Time. Collagen patch   Vitamin c   Yes Florence Brandon MD   amoxicillin-clavulanate (AUGMENTIN) 875-125 MG per tablet Take 1 tablet by mouth 2 (Two) Times a Day for 10 days. 12/6/23 12/16/23  Meli Fernandez APRN   busPIRone (BUSPAR) 5 MG tablet Take 1 tablet by mouth 3 (Three) Times a Day As Needed (anxiety).  Patient not taking: Reported on 12/6/2023 11/10/23   Renae  "Angelica C, APRN       Objective     Vital Signs: /84 (BP Location: Left arm, Patient Position: Sitting, Cuff Size: Adult)   Pulse 81   Temp 97.9 °F (36.6 °C) (Temporal)   Ht 160 cm (62.99\")   Wt 65.5 kg (144 lb 6.4 oz)   LMP  (LMP Unknown)   SpO2 100%   BMI 25.59 kg/m²   Physical Exam  Vitals and nursing note reviewed.   Constitutional:       General: She is not in acute distress.     Appearance: She is not ill-appearing or toxic-appearing.   HENT:      Head: Normocephalic and atraumatic.      Right Ear: A middle ear effusion is present.      Left Ear: A middle ear effusion is present.      Nose:      Right Sinus: Maxillary sinus tenderness and frontal sinus tenderness present.      Left Sinus: Maxillary sinus tenderness and frontal sinus tenderness present.      Mouth/Throat:      Mouth: Mucous membranes are moist.      Pharynx: Oropharynx is clear. Posterior oropharyngeal erythema present.   Cardiovascular:      Rate and Rhythm: Normal rate and regular rhythm.      Pulses: Normal pulses.      Heart sounds: Normal heart sounds.   Pulmonary:      Effort: Pulmonary effort is normal.      Breath sounds: No wheezing, rhonchi or rales.   Abdominal:      General: Bowel sounds are normal. There is no distension.      Palpations: Abdomen is soft.      Tenderness: There is no abdominal tenderness.   Musculoskeletal:         General: No swelling or tenderness. Normal range of motion.      Cervical back: Normal range of motion and neck supple. No tenderness.   Skin:     General: Skin is warm and dry.      Findings: No erythema or rash.   Neurological:      General: No focal deficit present.      Mental Status: She is alert and oriented to person, place, and time.   Psychiatric:         Mood and Affect: Mood normal.         Behavior: Behavior normal.         Thought Content: Thought content normal.         Judgment: Judgment normal.       Results Reviewed:  Comprehensive metabolic panel (08/14/2023 07:51) "     Assessment / Plan     Assessment/Plan:  Diagnoses and all orders for this visit:    1. Acute non-recurrent pansinusitis (Primary)  -     amoxicillin-clavulanate (AUGMENTIN) 875-125 MG per tablet; Take 1 tablet by mouth 2 (Two) Times a Day for 10 days.  Dispense: 20 tablet; Refill: 0       Patient presents to the office today with 1 month of waxing and waning sinus symptoms.  She has had sinus pressure, worse on the left side.  She does complain of postnasal drip.  She denies any cough, shortness of breath, fever/chills.  She has had a scratchy, sore throat.  She has been using Claritin and Flonase on a regular basis and has also been using Mucinex as well as a Neti pot without relief of symptoms.  Patient will be treated for sinusitis with a course of Augmentin.  She does relate a history of irritable bowel syndrome so we discussed utilizing a probiotic while on antibiotic therapy.  We did discuss utilization of steroid therapy to help decrease inflammation in the sinuses and the patient politely declines.  I have asked her to call office for worsening or no improvement of symptoms.    Return if symptoms worsen or fail to improve, for Next scheduled follow up. unless patient needs to be seen sooner or acute issues arise.    I have discussed the patient results/orders and and plan/recommendation with them at today's visit.      Meli Fernandez, BERT   12/06/2023

## 2023-12-21 DIAGNOSIS — F41.9 ANXIETY: ICD-10-CM

## 2023-12-21 RX ORDER — LORAZEPAM 0.5 MG/1
0.5 TABLET ORAL AS NEEDED
Qty: 30 TABLET | Refills: 0 | Status: SHIPPED | OUTPATIENT
Start: 2023-12-21

## 2024-01-19 DIAGNOSIS — F41.9 ANXIETY: ICD-10-CM

## 2024-01-22 RX ORDER — LORAZEPAM 0.5 MG/1
0.5 TABLET ORAL AS NEEDED
Qty: 30 TABLET | Refills: 0 | OUTPATIENT
Start: 2024-01-22

## 2024-01-22 RX ORDER — LORAZEPAM 0.5 MG/1
0.5 TABLET ORAL AS NEEDED
Qty: 30 TABLET | Refills: 0 | Status: SHIPPED | OUTPATIENT
Start: 2024-01-22

## 2024-02-16 ENCOUNTER — OFFICE VISIT (OUTPATIENT)
Dept: INTERNAL MEDICINE | Facility: CLINIC | Age: 69
End: 2024-02-16
Payer: MEDICARE

## 2024-02-16 VITALS
TEMPERATURE: 97.2 F | WEIGHT: 140.2 LBS | SYSTOLIC BLOOD PRESSURE: 138 MMHG | BODY MASS INDEX: 24.84 KG/M2 | HEIGHT: 63 IN | OXYGEN SATURATION: 100 % | DIASTOLIC BLOOD PRESSURE: 78 MMHG | HEART RATE: 69 BPM

## 2024-02-16 DIAGNOSIS — J30.9 ALLERGIC RHINITIS, UNSPECIFIED SEASONALITY, UNSPECIFIED TRIGGER: ICD-10-CM

## 2024-02-16 DIAGNOSIS — Z79.899 ENCOUNTER FOR LONG-TERM CURRENT USE OF MEDICATION: Primary | ICD-10-CM

## 2024-02-16 DIAGNOSIS — R73.03 PREDIABETES: ICD-10-CM

## 2024-02-16 DIAGNOSIS — F41.9 ANXIETY: Chronic | ICD-10-CM

## 2024-02-16 DIAGNOSIS — K21.9 GASTROESOPHAGEAL REFLUX DISEASE WITHOUT ESOPHAGITIS: ICD-10-CM

## 2024-02-16 DIAGNOSIS — E55.9 VITAMIN D DEFICIENCY: ICD-10-CM

## 2024-02-16 DIAGNOSIS — E78.2 MIXED HYPERLIPIDEMIA: ICD-10-CM

## 2024-02-16 LAB
AMPHET+METHAMPHET UR QL: NEGATIVE
AMPHETAMINE INTERNAL CONTROL: ABNORMAL
AMPHETAMINES UR QL: NEGATIVE
BARBITURATE INTERNAL CONTROL: ABNORMAL
BARBITURATES UR QL SCN: NEGATIVE
BENZODIAZ UR QL SCN: POSITIVE
BENZODIAZEPINE INTERNAL CONTROL: ABNORMAL
BUPRENORPHINE INTERNAL CONTROL: ABNORMAL
BUPRENORPHINE SERPL-MCNC: NEGATIVE NG/ML
CANNABINOIDS SERPL QL: NEGATIVE
COCAINE INTERNAL CONTROL: ABNORMAL
COCAINE UR QL: NEGATIVE
EXPIRATION DATE: ABNORMAL
Lab: ABNORMAL
MDMA (ECSTASY) INTERNAL CONTROL: ABNORMAL
MDMA UR QL SCN: NEGATIVE
METHADONE INTERNAL CONTROL: ABNORMAL
METHADONE UR QL SCN: NEGATIVE
METHAMPHETAMINE INTERNAL CONTROL: ABNORMAL
MORPHINE INTERNAL CONTROL: ABNORMAL
MORPHINE/OPIATES SCREEN, URINE: NEGATIVE
OXYCODONE INTERNAL CONTROL: ABNORMAL
OXYCODONE UR QL SCN: POSITIVE
PCP UR QL SCN: NEGATIVE
PHENCYCLIDINE INTERNAL CONTROL: ABNORMAL
PROPOXYPH UR QL SCN: NEGATIVE
PROPOXYPHENE INTERNAL CONTROL: ABNORMAL
THC INTERNAL CONTROL: ABNORMAL
TRICYCLIC ANTIDEPRESSANTS INTERNAL CONTROL: ABNORMAL
TRICYCLICS UR QL SCN: NEGATIVE

## 2024-02-16 RX ORDER — FLUTICASONE PROPIONATE 50 MCG
2 SPRAY, SUSPENSION (ML) NASAL DAILY
Qty: 48 ML | Refills: 3 | Status: SHIPPED | OUTPATIENT
Start: 2024-02-16

## 2024-02-17 LAB
25(OH)D3+25(OH)D2 SERPL-MCNC: 53.3 NG/ML (ref 30–100)
ALBUMIN SERPL-MCNC: 4.7 G/DL (ref 3.9–4.9)
ALBUMIN/GLOB SERPL: 1.8 {RATIO} (ref 1.2–2.2)
ALP SERPL-CCNC: 68 IU/L (ref 44–121)
ALT SERPL-CCNC: 17 IU/L (ref 0–32)
AST SERPL-CCNC: 15 IU/L (ref 0–40)
BILIRUB SERPL-MCNC: 0.5 MG/DL (ref 0–1.2)
BUN SERPL-MCNC: 18 MG/DL (ref 8–27)
BUN/CREAT SERPL: 20 (ref 12–28)
CALCIUM SERPL-MCNC: 9.8 MG/DL (ref 8.7–10.3)
CHLORIDE SERPL-SCNC: 100 MMOL/L (ref 96–106)
CO2 SERPL-SCNC: 24 MMOL/L (ref 20–29)
CREAT SERPL-MCNC: 0.9 MG/DL (ref 0.57–1)
EGFRCR SERPLBLD CKD-EPI 2021: 69 ML/MIN/1.73
GLOBULIN SER CALC-MCNC: 2.6 G/DL (ref 1.5–4.5)
GLUCOSE SERPL-MCNC: 96 MG/DL (ref 70–99)
HBA1C MFR BLD: 6.1 % (ref 4.8–5.6)
POTASSIUM SERPL-SCNC: 4.7 MMOL/L (ref 3.5–5.2)
PROT SERPL-MCNC: 7.3 G/DL (ref 6–8.5)
SODIUM SERPL-SCNC: 140 MMOL/L (ref 134–144)
TSH SERPL DL<=0.005 MIU/L-ACNC: 0.91 UIU/ML (ref 0.45–4.5)

## 2024-02-20 PROBLEM — R73.03 PREDIABETES: Status: ACTIVE | Noted: 2024-02-20

## 2024-02-20 NOTE — PROGRESS NOTES
"      Chief Complaint  Anxiety (6 month follow up//CSA and UDS)    Subjective        Joi YANIRA Mcqueen presents to [facility]    HPI    Patient here for the above problems.  See Assessment and Plan for further HPI components.      Review of Systems    Objective   Vital Signs:  /78 (BP Location: Left arm, Patient Position: Sitting, Cuff Size: Adult)   Pulse 69   Temp 97.2 °F (36.2 °C) (Temporal)   Ht 160 cm (62.99\")   Wt 63.6 kg (140 lb 3.2 oz)   SpO2 100%   BMI 24.84 kg/m²   Estimated body mass index is 24.84 kg/m² as calculated from the following:    Height as of this encounter: 160 cm (62.99\").    Weight as of this encounter: 63.6 kg (140 lb 3.2 oz).      Physical Exam  Vitals and nursing note reviewed.   Constitutional:       Appearance: She is not ill-appearing.   Eyes:      General: No scleral icterus.     Conjunctiva/sclera: Conjunctivae normal.   Pulmonary:      Effort: Pulmonary effort is normal. No respiratory distress.   Skin:     General: Skin is warm.      Coloration: Skin is not pale.   Neurological:      General: No focal deficit present.      Mental Status: She is alert and oriented to person, place, and time.   Psychiatric:         Mood and Affect: Mood normal.         Behavior: Behavior normal.                         Assessment and Plan   Diagnoses and all orders for this visit:    1. Encounter for long-term current use of medication (Primary)  -     POC Medline 14 Panel Urine Drug Screen    2. Mixed hyperlipidemia  -     TSH Rfx On Abnormal To Free T4  -     Comprehensive metabolic panel    3. Vitamin D deficiency  -     Vitamin D,25-Hydroxy  -     Comprehensive metabolic panel    4. Anxiety    5. Gastroesophageal reflux disease without esophagitis    6. Prediabetes  -     Hemoglobin A1c  -     Comprehensive metabolic panel  -     Comprehensive Metabolic Panel    7. Allergic rhinitis, unspecified seasonality, unspecified trigger  -     fluticasone " (FLONASE) 50 MCG/ACT nasal spray; 2 sprays into the nostril(s) as directed by provider Daily.  Dispense: 48 mL; Refill: 3      Patient has a history of GERD.  Patient on omeprazole.  Patient stable.  Continue omperazole.  Continue to monitor.    Allergic rhinitis.  Out of flonase.  Will refill.     Patient has anxiety.  Patient utilizes buspirone.  Not sure its working as well anymore.  She indicates that it seems to be different than previous.  Uses ativan PRN.  PDMP reviewed. CSA and UDS updated as necessary.      Patient has hyperlipidemia.  Patient is currently on no statin due to patient preference .  Patient is not at goal  Recommend we  continue to monitor at present.  Recheck at patients annual visit and discuss statin medication.   Recommend increased physical activity.  Recommend a diet that focuses on consumption of fruits, vegetables, fiber, and monounsaturated fats and minimizes intake of saturated and trans fats, simple carbohydrates, and red meats. Examples of heart-healthy diets include the DASH (Dietary Approaches to Stop Hypertension) and Mediterranean diets.     History of vitamin D deficiency.  Has not been checked in a while.  Post-menopausal.  Continue vitamin D.  Will check.      Result Review :           BMI is within normal parameters. No other follow-up for BMI required.      BMI is within normal parameters. No other follow-up for BMI required.            Follow Up   Return in about 6 months (around 8/19/2024), or if symptoms worsen or fail to improve, for Recheck, Medicare Wellness.  Patient was given instructions and counseling regarding her condition or for health maintenance advice. Please see specific information pulled into the AVS if appropriate.       LENNY Plunkett MD, FACP, [facility]      Electronically signed by Lewis Plunkett MD, 02/20/24, 12:03 PM CST.

## 2024-04-11 DIAGNOSIS — F41.9 ANXIETY: Chronic | ICD-10-CM

## 2024-04-11 RX ORDER — BUSPIRONE HYDROCHLORIDE 5 MG/1
5 TABLET ORAL 3 TIMES DAILY PRN
Qty: 90 TABLET | Refills: 2 | Status: SHIPPED | OUTPATIENT
Start: 2024-04-11

## 2024-08-12 ENCOUNTER — OFFICE VISIT (OUTPATIENT)
Dept: INTERNAL MEDICINE | Facility: CLINIC | Age: 69
End: 2024-08-12
Payer: MEDICARE

## 2024-08-12 VITALS
WEIGHT: 142.4 LBS | OXYGEN SATURATION: 98 % | TEMPERATURE: 97.5 F | HEIGHT: 63 IN | BODY MASS INDEX: 25.23 KG/M2 | DIASTOLIC BLOOD PRESSURE: 76 MMHG | HEART RATE: 76 BPM | SYSTOLIC BLOOD PRESSURE: 132 MMHG

## 2024-08-12 DIAGNOSIS — J01.00 ACUTE NON-RECURRENT MAXILLARY SINUSITIS: Primary | ICD-10-CM

## 2024-08-12 DIAGNOSIS — J34.89 SINUS PRESSURE: ICD-10-CM

## 2024-08-12 PROCEDURE — 1160F RVW MEDS BY RX/DR IN RCRD: CPT

## 2024-08-12 PROCEDURE — 1159F MED LIST DOCD IN RCRD: CPT

## 2024-08-12 PROCEDURE — 1125F AMNT PAIN NOTED PAIN PRSNT: CPT

## 2024-08-12 PROCEDURE — 99213 OFFICE O/P EST LOW 20 MIN: CPT

## 2024-08-12 RX ORDER — AMOXICILLIN AND CLAVULANATE POTASSIUM 875; 125 MG/1; MG/1
1 TABLET, FILM COATED ORAL 2 TIMES DAILY
Qty: 14 TABLET | Refills: 0 | Status: SHIPPED | OUTPATIENT
Start: 2024-08-12 | End: 2024-08-19

## 2024-08-12 NOTE — PROGRESS NOTES
Subjective   Joi Mcqueen is a 69 y.o. female.   Chief Complaint   Patient presents with    Sinus Pressure     Patient complains of sinus pressure x 1 month.   Experiencing nasal congestion, states her throat is burning.   Taken Mucinex, Flonase nasal spray, Claritin.     Ear Fullness     Patient complains of bilateral ear fullness x 1 week.           Joi presents the office today with concerns of sinus infection.  She explains history of having recurrent sinus infections related to fairly significant environmental allergies and left deviated septum.  She states it is not  atypical for her to have 2-3 infections per year.  Her last sinus infection is documented as being in December and she confirms this.  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  She has been faithfully using her Flonase, Claritin, has been using her [place] pot, taking Mucinex and has not noted any improvement in her symptoms.  She is denying fever, sore throat, chills, shortness of breath or chest pain.  The following portions of the patient's history were reviewed and updated as appropriate: allergies, current medications, past family history, past medical history, past social history, past surgical history and problem list.    Review of Systems    Objective   Past Medical History:   Diagnosis Date    Chronic back pain     Chronic neck pain     Claustrophobia     Colon polyp     DDD (degenerative disc disease), lumbar     GERD (gastroesophageal reflux disease)      Hyperlipidemia     Hyperlipidemia     IBS (irritable bowel syndrome)     Low back pain     Peptic ulcer     Sleep apnea       Past Surgical History:   Procedure Laterality Date    BLADDER SUSPENSION      CATARACT EXTRACTION Bilateral     COLONOSCOPY  04/16/2015    COLONOSCOPY N/A 5/31/2018    Procedure: COLONOSCOPY WITH ANESTHESIA;  Surgeon: Jed Lutz MD;  Location:  [facility];  Service: Gastroenterology    COLONOSCOPY N/A 8/9/2023    Procedure: COLONOSCOPY WITH ANESTHESIA;  Surgeon: Jed Lutz MD;  Location:  [facility];  Service: Gastroenterology;  Laterality: N/A;  pre hx colon polyp  post polyp  dr jack delgadillo    ENDOSCOPY  02/17/2004    HYSTERECTOMY      OOPHORECTOMY          Current Outpatient Medications:     busPIRone (BUSPAR) 5 MG tablet, Take 1 tablet by mouth 3 (Three) Times a Day As Needed (anxiety)., Disp: 90 tablet, Rfl: 2    fluticasone (FLONASE) 50 MCG/ACT nasal spray, 2 sprays into the nostril(s) as directed by provider Daily., Disp: 48 mL, Rfl: 3    GuaiFENesin (MUCINEX PO), Take 1 tablet by mouth 2 (Two) Times a Day., Disp: , Rfl:     hyoscyamine sulfate (ANASPAZ) 0.125 MG tablet dispersible disintegrating tablet, PLACE 1 TABLET ON THE TONGUE EVERY 6 (SIX) HOURS AS NEEDED (FOR IBS)., Disp: 60 tablet, Rfl: 1    Loratadine (CLARITIN PO), Take 1 tablet by mouth As Needed., Disp: , Rfl:     LORazepam (ATIVAN) 0.5 MG tablet, TAKE 1 TABLET BY MOUTH AS NEEDED FOR ANXIETY, Disp: 30 tablet, Rfl: 0    Omega-3 Fatty Acids (OMEGA 3 PO), Take  by mouth Every Other Day., Disp: , Rfl:     omeprazole (priLOSEC) 20 MG capsule, TAKE 1 CAPSULE BY MOUTH EVERY DAY, Disp: 90 capsule, Rfl: 3    oxyCODONE-acetaminophen (PERCOCET) 5-325 MG per tablet, Take 1 tablet by mouth 4 (Four) Times a Day., Disp: , Rfl:     Unable to find, 1 each 1 (One) Time. Vitamin D,C,Zinc patch, Disp: , Rfl:     Unable to find, 1 each 1 (One) Time. Collagen patch  Vitamin c, Disp: , Rfl:      "amoxicillin-clavulanate (AUGMENTIN) 875-125 MG per tablet, Take 1 tablet by mouth 2 (Two) Times a Day for 7 days., Disp: 14 tablet, Rfl: 0      /76 (BP Location: Left arm, Patient Position: Sitting, Cuff Size: Adult)   Pulse 76   Temp 97.5 °F (36.4 °C) (Infrared)   Ht 160 cm (62.99\")   Wt 64.6 kg (142 lb 6.4 oz)   LMP  (LMP Unknown)   SpO2 98%   BMI 25.23 kg/m²      Body mass index is 25.23 kg/m².         Physical Exam  Vitals and nursing note reviewed.   Constitutional:       General: She is not in acute distress.     Appearance: She is ill-appearing. She is not toxic-appearing or diaphoretic.   HENT:      Head: Normocephalic and atraumatic.      Right Ear: Ear canal and external ear normal. There is no impacted cerumen. Tympanic membrane is erythematous and bulging.      Left Ear: Ear canal and external ear normal. Tenderness present. There is no impacted cerumen. Tympanic membrane is erythematous and bulging.      Nose: Congestion present.      Right Turbinates: Enlarged and swollen.      Left Turbinates: Enlarged and swollen.      Left Sinus: Maxillary sinus tenderness and frontal sinus tenderness present.      Mouth/Throat:      Pharynx: Posterior oropharyngeal erythema present. No oropharyngeal exudate.   Eyes:      Extraocular Movements: Extraocular movements intact.      Conjunctiva/sclera: Conjunctivae normal.      Pupils: Pupils are equal, round, and reactive to light.   Cardiovascular:      Rate and Rhythm: Normal rate and regular rhythm.      Pulses: Normal pulses.      Heart sounds: Normal heart sounds.   Pulmonary:      Effort: Pulmonary effort is normal.      Breath sounds: Normal breath sounds.   Abdominal:      General: Bowel sounds are normal.      Palpations: Abdomen is soft.   Musculoskeletal:         General: Normal range of motion.      Cervical back: Normal range of motion and neck supple.   Lymphadenopathy:      Cervical: Cervical adenopathy present.   Skin:     General: Skin is " warm and dry.   Neurological:      General: No focal deficit present.      Mental Status: She is alert and oriented to person, place, and time. Mental status is at baseline.      Motor: No weakness.      Gait: Gait normal.   Psychiatric:         Mood and Affect: Mood normal.         Behavior: Behavior normal.         Thought Content: Thought content normal.         Judgment: Judgment normal.               Assessment & Plan   Diagnoses and all orders for this visit:    1. Acute non-recurrent maxillary sinusitis (Primary)  -     amoxicillin-clavulanate (AUGMENTIN) 875-125 MG per tablet; Take 1 tablet by mouth 2 (Two) Times a Day for 7 days.  Dispense: 14 tablet; Refill: 0    2. Sinus pressure               Plan of care reviewed with Joi  She has already implemented therapies I would typically recommend, recommended continuing with as needed use of [place] pot, the Flonase, Mucinex, Claritin, has not had any antibiotic administration since December, Augmentin typically works well for her.  She is aware to let me know if symptoms do not gradually improve over the next 48 hours, prior to this if increased severity of symptoms.  Return at next scheduled appointment on 8/21/2024, prior to as needed.      Please note that portions of this note were completed with a voice recognition program.     Electronically signed by BERT Suazo, 08/12/24, 09:11 CDT.

## 2024-08-21 ENCOUNTER — OFFICE VISIT (OUTPATIENT)
Dept: INTERNAL MEDICINE | Facility: CLINIC | Age: 69
End: 2024-08-21
Payer: MEDICARE

## 2024-08-21 ENCOUNTER — TELEPHONE (OUTPATIENT)
Dept: INTERNAL MEDICINE | Facility: CLINIC | Age: 69
End: 2024-08-21

## 2024-08-21 VITALS
DIASTOLIC BLOOD PRESSURE: 80 MMHG | HEIGHT: 63 IN | OXYGEN SATURATION: 98 % | TEMPERATURE: 98.2 F | BODY MASS INDEX: 25.16 KG/M2 | HEART RATE: 61 BPM | WEIGHT: 142 LBS | SYSTOLIC BLOOD PRESSURE: 142 MMHG

## 2024-08-21 DIAGNOSIS — Z79.899 ENCOUNTER FOR LONG-TERM CURRENT USE OF MEDICATION: ICD-10-CM

## 2024-08-21 DIAGNOSIS — R73.01 IFG (IMPAIRED FASTING GLUCOSE): ICD-10-CM

## 2024-08-21 DIAGNOSIS — E55.9 VITAMIN D DEFICIENCY: ICD-10-CM

## 2024-08-21 DIAGNOSIS — E78.2 MIXED HYPERLIPIDEMIA: ICD-10-CM

## 2024-08-21 DIAGNOSIS — F41.9 ANXIETY: ICD-10-CM

## 2024-08-21 DIAGNOSIS — Z12.31 SCREENING MAMMOGRAM, ENCOUNTER FOR: ICD-10-CM

## 2024-08-21 PROCEDURE — 1160F RVW MEDS BY RX/DR IN RCRD: CPT | Performed by: INTERNAL MEDICINE

## 2024-08-21 PROCEDURE — G0439 PPPS, SUBSEQ VISIT: HCPCS | Performed by: INTERNAL MEDICINE

## 2024-08-21 PROCEDURE — 1126F AMNT PAIN NOTED NONE PRSNT: CPT | Performed by: INTERNAL MEDICINE

## 2024-08-21 PROCEDURE — 1159F MED LIST DOCD IN RCRD: CPT | Performed by: INTERNAL MEDICINE

## 2024-08-21 PROCEDURE — 1170F FXNL STATUS ASSESSED: CPT | Performed by: INTERNAL MEDICINE

## 2024-08-21 RX ORDER — LORAZEPAM 0.5 MG/1
0.5 TABLET ORAL AS NEEDED
Qty: 30 TABLET | Refills: 1 | Status: SHIPPED | OUTPATIENT
Start: 2024-08-21

## 2024-08-21 NOTE — PROGRESS NOTES
Subjective   The ABCs of the Annual Wellness Visit  Medicare Wellness Visit      Joi Mcqueen is a 69 y.o. patient who presents for a Medicare Wellness Visit.    The following portions of the patient's history were reviewed and   updated as appropriate: allergies, current medications, past family history, past medical history, past social history, past surgical history, and problem list.    Compared to one year ago, the patient's physical   health is the same.  Compared to one year ago, the patient's mental   health is the same.    Recent Hospitalizations:  She was not admitted to the hospital during the last year.     Current Medical Providers:  Patient Care Team:  Lewis Plunkett MD as PCP - General (Internal Medicine)  Jed Lutz MD as Consulting Physician (Gastroenterology)    Outpatient Medications Prior to Visit   Medication Sig Dispense Refill    fluticasone (FLONASE) 50 MCG/ACT nasal spray 2 sprays into the nostril(s) as directed by provider Daily. 48 mL 3    GuaiFENesin (MUCINEX PO) Take 1 tablet by mouth 2 (Two) Times a Day.      hyoscyamine sulfate (ANASPAZ) 0.125 MG tablet dispersible disintegrating tablet PLACE 1 TABLET ON THE TONGUE EVERY 6 (SIX) HOURS AS NEEDED (FOR IBS). 60 tablet 1    Loratadine (CLARITIN PO) Take 1 tablet by mouth As Needed.      Omega-3 Fatty Acids (OMEGA 3 PO) Take  by mouth Every Other Day.      omeprazole (priLOSEC) 20 MG capsule TAKE 1 CAPSULE BY MOUTH EVERY DAY 90 capsule 3    oxyCODONE-acetaminophen (PERCOCET) 5-325 MG per tablet Take 1 tablet by mouth 4 (Four) Times a Day.      Unable to find 1 each 1 (One) Time. Vitamin D,C,Zinc patch      Unable to find 1 each 1 (One) Time. Collagen patch   Vitamin c      LORazepam (ATIVAN) 0.5 MG tablet TAKE 1 TABLET BY MOUTH AS NEEDED FOR ANXIETY 30 tablet 0    busPIRone (BUSPAR) 5 MG tablet Take 1 tablet by mouth 3 (Three) Times a Day As Needed (anxiety). (Patient not taking: Reported on 8/21/2024) 90 tablet 2  "    No facility-administered medications prior to visit.     Opioid medication/s are on active medication list.  and I have evaluated her active treatment plan and pain score trends (see table).  Vitals:    08/21/24 0957   PainSc: 0-No pain     I have reviewed the chart for potential of high risk medication and harmful drug interactions in the elderly.        Aspirin is not on active medication list.  Aspirin use is not indicated based on review of current medical condition/s. Risk of harm outweighs potential benefits.  .    Patient Active Problem List   Diagnosis    DDD (degenerative disc disease), lumbar    Chronic midline low back pain with bilateral sciatica    Lumbar radiculopathy    Chronic constipation    Hx of colonic polyp    Obstructive sleep apnea    Other insomnia    GERD (gastroesophageal reflux disease)    Hyperlipidemia    Cervical disc disease    BiPAP (biphasic positive airway pressure) dependence    IFG (impaired fasting glucose)    Anxiety    Family hx of colon cancer    Prediabetes     Advance Care Planning Advance Directive is not on file.  ACP discussion was held with the patient during this visit. Patient does not have an advance directive, information provided.            Objective   Vitals:    08/21/24 0957   BP: 142/80   BP Location: Left arm   Patient Position: Sitting   Cuff Size: Adult   Pulse: 61   Temp: 98.2 °F (36.8 °C)   TempSrc: Temporal   SpO2: 98%   Weight: 64.4 kg (142 lb)   Height: 160 cm (63\")   PainSc: 0-No pain       Estimated body mass index is 25.15 kg/m² as calculated from the following:    Height as of this encounter: 160 cm (63\").    Weight as of this encounter: 64.4 kg (142 lb).    BMI is >= 25 and <30. (Overweight) The following options were offered after discussion;: exercise counseling/recommendations and nutrition counseling/recommendations       Does the patient have evidence of cognitive impairment? No                                                                "                                Health  Risk Assessment    Smoking Status:  Social History     Tobacco Use   Smoking Status Former    Current packs/day: 0.00    Types: Cigarettes    Quit date:     Years since quittin.6   Smokeless Tobacco Never     Alcohol Consumption:  Social History     Substance and Sexual Activity   Alcohol Use No       Fall Risk Screen  STEADI Fall Risk Assessment was completed, and patient is at LOW risk for falls.Assessment completed on:2024    Depression Screenin/21/2024     9:56 AM   PHQ-2/PHQ-9 Depression Screening   Little Interest or Pleasure in Doing Things 0-->not at all   Feeling Down, Depressed or Hopeless 0-->not at all   PHQ-9: Brief Depression Severity Measure Score 0     Health Habits and Functional and Cognitive Screenin/21/2024     9:56 AM   Functional & Cognitive Status   Do you have difficulty preparing food and eating? No   Do you have difficulty bathing yourself, getting dressed or grooming yourself? No   Do you have difficulty using the toilet? No   Do you have difficulty moving around from place to place? No   Do you have trouble with steps or getting out of a bed or a chair? No   Current Diet Limited Junk Food   Dental Exam Up to date   Eye Exam Up to date   Exercise (times per week) 7 times per week   Current Exercises Include Walking;Treadmill;Yard Work;Light Weights   Do you need help using the phone?  No   Are you deaf or do you have serious difficulty hearing?  No   Do you need help to go to places out of walking distance? No   Do you need help shopping? No   Do you need help preparing meals?  No   Do you need help with housework?  No   Do you need help with laundry? No   Do you need help taking your medications? No   Do you need help managing money? No   Do you ever drive or ride in a car without wearing a seat belt? No   Have you felt unusual stress, anger or loneliness in the last month? No   Who do you live with? Other   If you  need help, do you have trouble finding someone available to you? No   Have you been bothered in the last four weeks by sexual problems? No   Do you have difficulty concentrating, remembering or making decisions? No           Age-appropriate Screening Schedule:  Refer to the list below for future screening recommendations based on patient's age, sex and/or medical conditions. Orders for these recommended tests are listed in the plan section. The patient has been provided with a written plan.    Health Maintenance List  Health Maintenance   Topic Date Due    BMI FOLLOWUP  05/19/2021    COVID-19 Vaccine (1 - 2023-24 season) Never done    LIPID PANEL  08/14/2024    INFLUENZA VACCINE  08/01/2024    Pneumococcal Vaccine 65+ (1 of 1 - PCV) 02/16/2025 (Originally 3/21/2019)    ANNUAL WELLNESS VISIT  08/21/2025    MAMMOGRAM  08/29/2025    DXA SCAN  08/29/2025    COLORECTAL CANCER SCREENING  08/09/2028    HEPATITIS C SCREENING  Completed    PAP SMEAR  Discontinued    TDAP/TD VACCINES  Discontinued    ZOSTER VACCINE  Discontinued                                                                                                                                                 CMS Preventative Services Quick Reference  Risk Factors Identified During Encounter  Immunizations Discussed/Encouraged: Influenza and COVID19  Dental Screening Recommended  Vision Screening Recommended    The above risks/problems have been discussed with the patient.  Pertinent information has been shared with the patient in the After Visit Summary.  An After Visit Summary and PPPS were made available to the patient.    Follow Up:   Next Medicare Wellness visit to be scheduled in 1 year.        Diagnoses and all orders for this visit:    1. IFG (impaired fasting glucose)  -     Hemoglobin A1c    2. Vitamin D deficiency  -     Vitamin D,25-Hydroxy    3. Mixed hyperlipidemia  -     Lipid Panel  -     TSH Rfx On Abnormal To Free T4    4. Encounter for long-term  current use of medication  -     CBC & Differential  -     Comprehensive Metabolic Panel  -     Urinalysis With Microscopic - Urine, Clean Catch  -     Lipid Panel  -     TSH Rfx On Abnormal To Free T4  -     Hemoglobin A1c    5. Screening mammogram, encounter for  -     Mammo Screening Digital Tomosynthesis Bilateral With CAD; Future    6. Anxiety  -     LORazepam (ATIVAN) 0.5 MG tablet; Take 1 tablet by mouth As Needed for Anxiety.  Dispense: 30 tablet; Refill: 1        Recommend at least annual dental and vision screening.  Recommend annual influenza vaccination  Recommend a varied diet and appropriate portion sizes.   CDC recommendations for physical activity:  At least 150 minutes a week (for example, 30 minutes a day, 5 days a week) of moderate-intensity activity such as brisk walking. Or can consider 75 minutes a week of vigorous-intensity activity such as hiking, jogging, or running.  At least 2 days a week of activities that strengthen muscles.  Plus activities to improve balance.    Patient has blurred vision in the right eye.  Patient has had cataract surgery previously as well as fluid drawn off the eye.  The patient has seen Dr. Lee.  The blurred vision is worse after waking up or after a nap.  The patient should try doing her eye drops before nap and after.  I am worried about dry eye syndrome.  She has been told she has this before.        Result Review :  The following data was reviewed by: Lewis Plunkett MD on 08/21/2024:  CMP          2/16/2024    09:35   CMP   Glucose 96    BUN 18    Creatinine 0.90    Sodium 140    Potassium 4.7    Chloride 100    Calcium 9.8    Total Protein 7.3    Albumin 4.7    Globulin 2.6    Total Bilirubin 0.5    Alkaline Phosphatase 68    AST (SGOT) 15    ALT (SGPT) 17    BUN/Creatinine Ratio 20          TSH          2/16/2024    09:35   TSH   TSH 0.908      A1C Last 3 Results          2/16/2024    09:35   HGBA1C Last 3 Results   Hemoglobin A1C 6.1                Follow Up:   Return in about 6 months (around 2/21/2025), or if symptoms worsen or fail to improve, for Med Check, follow up for above problems. Longitudinal care..     An After Visit Summary and PPPS were made available to the patient.                   LENNY Plunkett MD, FACP, [facility]      Electronically signed by Lewis Plunkett MD, 08/21/24, 10:12 AM CDT.

## 2024-08-22 LAB
25(OH)D3+25(OH)D2 SERPL-MCNC: 59.9 NG/ML (ref 30–100)
ALBUMIN SERPL-MCNC: 4.4 G/DL (ref 3.5–5.2)
ALBUMIN/GLOB SERPL: 1.7 G/DL
ALP SERPL-CCNC: 66 U/L (ref 39–117)
ALT SERPL-CCNC: 13 U/L (ref 1–33)
APPEARANCE UR: CLEAR
AST SERPL-CCNC: 19 U/L (ref 1–32)
BACTERIA #/AREA URNS HPF: NORMAL /HPF
BASOPHILS # BLD AUTO: 0.02 10*3/MM3 (ref 0–0.2)
BASOPHILS NFR BLD AUTO: 0.3 % (ref 0–1.5)
BILIRUB SERPL-MCNC: 0.5 MG/DL (ref 0–1.2)
BILIRUB UR QL STRIP: NEGATIVE
BUN SERPL-MCNC: 14 MG/DL (ref 8–23)
BUN/CREAT SERPL: 16.7 (ref 7–25)
CALCIUM SERPL-MCNC: 9.7 MG/DL (ref 8.6–10.5)
CASTS URNS MICRO: NORMAL
CHLORIDE SERPL-SCNC: 102 MMOL/L (ref 98–107)
CHOLEST SERPL-MCNC: 234 MG/DL (ref 0–200)
CO2 SERPL-SCNC: 26.1 MMOL/L (ref 22–29)
COLOR UR: YELLOW
CREAT SERPL-MCNC: 0.84 MG/DL (ref 0.57–1)
EGFRCR SERPLBLD CKD-EPI 2021: 74.9 ML/MIN/1.73
EOSINOPHIL # BLD AUTO: 0.05 10*3/MM3 (ref 0–0.4)
EOSINOPHIL NFR BLD AUTO: 0.8 % (ref 0.3–6.2)
EPI CELLS #/AREA URNS HPF: NORMAL /HPF
ERYTHROCYTE [DISTWIDTH] IN BLOOD BY AUTOMATED COUNT: 13.3 % (ref 12.3–15.4)
GLOBULIN SER CALC-MCNC: 2.6 GM/DL
GLUCOSE SERPL-MCNC: 99 MG/DL (ref 65–99)
GLUCOSE UR QL STRIP: NEGATIVE
HBA1C MFR BLD: 5.7 % (ref 4.8–5.6)
HCT VFR BLD AUTO: 40.2 % (ref 34–46.6)
HDLC SERPL-MCNC: 60 MG/DL (ref 40–60)
HGB BLD-MCNC: 12.9 G/DL (ref 12–15.9)
HGB UR QL STRIP: NEGATIVE
IMM GRANULOCYTES # BLD AUTO: 0.01 10*3/MM3 (ref 0–0.05)
IMM GRANULOCYTES NFR BLD AUTO: 0.2 % (ref 0–0.5)
KETONES UR QL STRIP: NEGATIVE
LDLC SERPL CALC-MCNC: 154 MG/DL (ref 0–100)
LEUKOCYTE ESTERASE UR QL STRIP: NEGATIVE
LYMPHOCYTES # BLD AUTO: 2.43 10*3/MM3 (ref 0.7–3.1)
LYMPHOCYTES NFR BLD AUTO: 38.9 % (ref 19.6–45.3)
MCH RBC QN AUTO: 29.6 PG (ref 26.6–33)
MCHC RBC AUTO-ENTMCNC: 32.1 G/DL (ref 31.5–35.7)
MCV RBC AUTO: 92.2 FL (ref 79–97)
MONOCYTES # BLD AUTO: 0.38 10*3/MM3 (ref 0.1–0.9)
MONOCYTES NFR BLD AUTO: 6.1 % (ref 5–12)
NEUTROPHILS # BLD AUTO: 3.35 10*3/MM3 (ref 1.7–7)
NEUTROPHILS NFR BLD AUTO: 53.7 % (ref 42.7–76)
NITRITE UR QL STRIP: NEGATIVE
NRBC BLD AUTO-RTO: 0 /100 WBC (ref 0–0.2)
PH UR STRIP: 7.5 [PH] (ref 5–8)
PLATELET # BLD AUTO: 309 10*3/MM3 (ref 140–450)
POTASSIUM SERPL-SCNC: 4.5 MMOL/L (ref 3.5–5.2)
PROT SERPL-MCNC: 7 G/DL (ref 6–8.5)
PROT UR QL STRIP: ABNORMAL
RBC # BLD AUTO: 4.36 10*6/MM3 (ref 3.77–5.28)
RBC #/AREA URNS HPF: NORMAL /HPF
SODIUM SERPL-SCNC: 140 MMOL/L (ref 136–145)
SP GR UR STRIP: 1.03 (ref 1–1.03)
TRIGL SERPL-MCNC: 112 MG/DL (ref 0–150)
TSH SERPL DL<=0.005 MIU/L-ACNC: 0.73 UIU/ML (ref 0.27–4.2)
UROBILINOGEN UR STRIP-MCNC: ABNORMAL MG/DL
VLDLC SERPL CALC-MCNC: 20 MG/DL (ref 5–40)
WBC # BLD AUTO: 6.24 10*3/MM3 (ref 3.4–10.8)
WBC #/AREA URNS HPF: NORMAL /HPF

## 2024-09-06 ENCOUNTER — HOSPITAL ENCOUNTER (OUTPATIENT)
Dept: MAMMOGRAPHY | Facility: HOSPITAL | Age: 69
Discharge: HOME | End: 2024-09-06
Admitting: INTERNAL MEDICINE
Payer: MEDICARE

## 2024-09-06 DIAGNOSIS — Z12.31 SCREENING MAMMOGRAM, ENCOUNTER FOR: ICD-10-CM

## 2024-09-06 PROCEDURE — 77067 SCR MAMMO BI INCL CAD: CPT

## 2024-09-06 PROCEDURE — 77063 BREAST TOMOSYNTHESIS BI: CPT

## 2025-02-20 ENCOUNTER — OFFICE VISIT (OUTPATIENT)
Dept: INTERNAL MEDICINE | Facility: CLINIC | Age: 70
End: 2025-02-20
Payer: MEDICARE

## 2025-02-20 VITALS
OXYGEN SATURATION: 98 % | TEMPERATURE: 98 F | HEART RATE: 69 BPM | WEIGHT: 148 LBS | HEIGHT: 63 IN | DIASTOLIC BLOOD PRESSURE: 70 MMHG | BODY MASS INDEX: 26.22 KG/M2 | SYSTOLIC BLOOD PRESSURE: 122 MMHG

## 2025-02-20 DIAGNOSIS — F41.9 ANXIETY: Primary | ICD-10-CM

## 2025-02-20 DIAGNOSIS — G47.33 OBSTRUCTIVE SLEEP APNEA: ICD-10-CM

## 2025-02-20 DIAGNOSIS — M50.90 CERVICAL DISC DISEASE: ICD-10-CM

## 2025-02-20 DIAGNOSIS — R73.03 PREDIABETES: ICD-10-CM

## 2025-02-20 DIAGNOSIS — Z79.899 ENCOUNTER FOR LONG-TERM CURRENT USE OF MEDICATION: ICD-10-CM

## 2025-02-20 DIAGNOSIS — M51.369 DEGENERATION OF INTERVERTEBRAL DISC OF LUMBAR REGION, UNSPECIFIED WHETHER PAIN PRESENT: ICD-10-CM

## 2025-02-20 DIAGNOSIS — J30.9 ALLERGIC RHINITIS, UNSPECIFIED SEASONALITY, UNSPECIFIED TRIGGER: ICD-10-CM

## 2025-02-20 DIAGNOSIS — E78.2 MIXED HYPERLIPIDEMIA: ICD-10-CM

## 2025-02-20 LAB — HBA1C MFR BLD: 5.8 % (ref 4.5–5.7)

## 2025-02-20 PROCEDURE — 3044F HG A1C LEVEL LT 7.0%: CPT | Performed by: INTERNAL MEDICINE

## 2025-02-20 PROCEDURE — 1126F AMNT PAIN NOTED NONE PRSNT: CPT | Performed by: INTERNAL MEDICINE

## 2025-02-20 PROCEDURE — 83036 HEMOGLOBIN GLYCOSYLATED A1C: CPT | Performed by: INTERNAL MEDICINE

## 2025-02-20 PROCEDURE — 99214 OFFICE O/P EST MOD 30 MIN: CPT | Performed by: INTERNAL MEDICINE

## 2025-02-20 RX ORDER — LORAZEPAM 0.5 MG/1
0.5 TABLET ORAL AS NEEDED
Qty: 30 TABLET | Refills: 2 | Status: SHIPPED | OUTPATIENT
Start: 2025-02-20

## 2025-02-20 RX ORDER — FLUTICASONE PROPIONATE 50 MCG
2 SPRAY, SUSPENSION (ML) NASAL DAILY
Qty: 9.9 G | Refills: 3 | Status: SHIPPED | OUTPATIENT
Start: 2025-02-20

## 2025-02-20 NOTE — PROGRESS NOTES
"      Chief Complaint  Follow-up (6 month follow up ), Prediabetes (A1c: 5.8), and pneumo vaccine    Subjective        Joi YANIRA Mcqueen presents to [facility]    HPI    Patient here for the above problems.  See Assessment and Plan for further HPI components.      Review of Systems    Objective   Vital Signs:  /70 (BP Location: Left arm, Patient Position: Sitting, Cuff Size: Adult)   Pulse 69   Temp 98 °F (36.7 °C) (Temporal)   Ht 160 cm (63\")   Wt 67.1 kg (148 lb)   SpO2 98%   BMI 26.22 kg/m²   Estimated body mass index is 26.22 kg/m² as calculated from the following:    Height as of this encounter: 160 cm (63\").    Weight as of this encounter: 67.1 kg (148 lb).      Physical Exam  Vitals and nursing note reviewed.   Constitutional:       Appearance: She is not ill-appearing.   Eyes:      General: No scleral icterus.     Conjunctiva/sclera: Conjunctivae normal.   Pulmonary:      Effort: Pulmonary effort is normal. No respiratory distress.   Skin:     General: Skin is warm.      Coloration: Skin is not pale.   Neurological:      General: No focal deficit present.      Mental Status: She is alert and oriented to person, place, and time.   Psychiatric:         Mood and Affect: Mood normal.         Behavior: Behavior normal.                       Assessment and Plan   Diagnoses and all orders for this visit:    1. Anxiety (Primary)  -     LORazepam (ATIVAN) 0.5 MG tablet; Take 1 tablet by mouth As Needed for Anxiety.  Dispense: 30 tablet; Refill: 2    2. Mixed hyperlipidemia  -     Lipid Panel; Future    3. Prediabetes  -     POC Glycated Hemoglobin, Total  -     Hemoglobin A1c; Future  -     Comprehensive Metabolic Panel; Future  -     CBC & Differential; Future  -     TSH Rfx On Abnormal To Free T4; Future  -     Urinalysis With Microscopic - Urine, Clean Catch; Future    4. Obstructive sleep apnea    5. Cervical disc disease    6. Degeneration of intervertebral disc of " lumbar region, unspecified whether pain present    7. Encounter for long-term current use of medication  -     Hemoglobin A1c; Future  -     Comprehensive Metabolic Panel; Future  -     CBC & Differential; Future  -     Lipid Panel; Future  -     TSH Rfx On Abnormal To Free T4; Future  -     Urinalysis With Microscopic - Urine, Clean Catch; Future        History of Present Illness  The patient is a 69-year-old female here for a follow-up visit.    She reports a stable condition with no significant changes in her health status. She maintains an active lifestyle, engaging in daily treadmill exercises for 45 minutes and incorporating weightlifting into her routine.    She has been managing chronic pain effectively with lorazepam, taking half a tablet at night to alleviate muscle tension. She expresses a desire to minimize her reliance on pain medication. She recalls a previous hysterectomy performed by Dr. Solano, during which she was prescribed Percocet 20 mg every 3 hours postoperatively. She attempted to transition to ibuprofen but discontinued its use due to gastrointestinal side effects.    She has not utilized her CPAP or BiPAP machine for over a week due to discomfort and the development of asthmatic bronchitis.    ALLERGIES  The patient is allergic to IBUPROFEN.    MEDICATIONS  Current: lorazepam      Assessment & Plan  1. Prediabetes  Her A1c levels have shown an increase from the previous reading but remain within a better range compared to the same period last year. Blood pressure readings are within normal limits. She is advised to consider receiving the pneumonia vaccine. She is encouraged to continue her exercise regimen.    Patient has prediabetes based on history of A1c >5.7 but < 6.5.  Current A1c is 5.8. Prediabetes places patient at risk for developing diabetes in the future.  Diabetes and prediabetes increase risk of cardiac disease and stroke.  It also increases risk of complications related to the  eyes, kidneys, and circulatory system among others.  Recommend lifestyle changes including increased activity and dietary changes.  Avoid eating in excess.  Avoid sugary/calorie beverages.  Try to focus on a low-carbohydrate diet.     2. Chronic pain. Anxiety  She experiences chronic pain and occasionally takes half a pill of lorazepam at night to relax her muscles. She is advised to be cautious with the dosing of lorazepam to avoid potential memory impairment and dependency. A prescription refill for lorazepam will be sent to [facility], [facility].  For neck and back pain follows with pain management.  Discussed avoidance of accident overdose with Benzodiazepines and opiates.      3. Asthmatic bronchitis. LIS.  She reports that using her CPAP or BiPAP exacerbates her asthmatic bronchitis. She is advised to avoid using the CPAP or BiPAP as it causes discomfort and flare-ups.  She did not wear her CPAP for long.              Result Review :                               Follow Up   Return in about 6 months (around 8/20/2025), or if symptoms worsen or fail to improve, for follow up for above problems. [facility] care., [facility] - Labs prior to visit.  Patient was given instructions and counseling regarding her condition or for health maintenance advice. Please see specific information pulled into the AVS if appropriate.       LENNY Plunkett MD, FACP, [facility]      Electronically signed by Lewis Plunkett MD, 02/20/25, 4:48 PM CST.    Patient or patient representative verbalized consent for the use of Ambient Listening during the visit with  Lewis Plunkett MD for chart documentation. 2/20/2025  16:51 CST

## 2025-04-10 ENCOUNTER — HOSPITAL ENCOUNTER (OUTPATIENT)
Dept: GENERAL RADIOLOGY | Facility: HOSPITAL | Age: 70
Discharge: HOME | End: 2025-04-10
Admitting: NURSE PRACTITIONER
Payer: MEDICARE

## 2025-04-10 ENCOUNTER — TRANSCRIBE ORDERS (OUTPATIENT)
Dept: ADMINISTRATIVE | Facility: HOSPITAL | Age: 70
End: 2025-04-10
Payer: MEDICARE

## 2025-04-10 DIAGNOSIS — M54.17 LUMBOSACRAL NEURITIS: ICD-10-CM

## 2025-04-10 DIAGNOSIS — M54.17 LUMBOSACRAL NEURITIS: Primary | ICD-10-CM

## 2025-04-10 DIAGNOSIS — M54.12 BRACHIAL NEURITIS: ICD-10-CM

## 2025-04-10 PROCEDURE — 72050 X-RAY EXAM NECK SPINE 4/5VWS: CPT

## 2025-04-10 PROCEDURE — 72110 X-RAY EXAM L-2 SPINE 4/>VWS: CPT

## 2025-08-25 ENCOUNTER — OFFICE VISIT (OUTPATIENT)
Dept: INTERNAL MEDICINE | Facility: CLINIC | Age: 70
End: 2025-08-25
Payer: MEDICARE

## 2025-08-25 VITALS
TEMPERATURE: 97.5 F | DIASTOLIC BLOOD PRESSURE: 70 MMHG | HEART RATE: 71 BPM | BODY MASS INDEX: 25.69 KG/M2 | SYSTOLIC BLOOD PRESSURE: 124 MMHG | OXYGEN SATURATION: 98 % | HEIGHT: 63 IN | WEIGHT: 145 LBS

## 2025-08-25 DIAGNOSIS — F41.9 ANXIETY: Chronic | ICD-10-CM

## 2025-08-25 DIAGNOSIS — K21.9 GASTROESOPHAGEAL REFLUX DISEASE WITHOUT ESOPHAGITIS: ICD-10-CM

## 2025-08-25 DIAGNOSIS — M54.42 CHRONIC MIDLINE LOW BACK PAIN WITH BILATERAL SCIATICA: Chronic | ICD-10-CM

## 2025-08-25 DIAGNOSIS — K59.09 CHRONIC CONSTIPATION: ICD-10-CM

## 2025-08-25 DIAGNOSIS — R73.03 PREDIABETES: ICD-10-CM

## 2025-08-25 DIAGNOSIS — G47.09 OTHER INSOMNIA: ICD-10-CM

## 2025-08-25 DIAGNOSIS — G89.29 CHRONIC MIDLINE LOW BACK PAIN WITH BILATERAL SCIATICA: Chronic | ICD-10-CM

## 2025-08-25 DIAGNOSIS — M54.41 CHRONIC MIDLINE LOW BACK PAIN WITH BILATERAL SCIATICA: Chronic | ICD-10-CM

## 2025-08-25 DIAGNOSIS — E78.2 MIXED HYPERLIPIDEMIA: Primary | ICD-10-CM

## 2025-08-25 DIAGNOSIS — M51.369 DEGENERATION OF INTERVERTEBRAL DISC OF LUMBAR REGION, UNSPECIFIED WHETHER PAIN PRESENT: ICD-10-CM

## 2025-08-25 PROCEDURE — G0439 PPPS, SUBSEQ VISIT: HCPCS | Performed by: INTERNAL MEDICINE

## 2025-08-25 PROCEDURE — 1159F MED LIST DOCD IN RCRD: CPT | Performed by: INTERNAL MEDICINE

## 2025-08-25 PROCEDURE — 1160F RVW MEDS BY RX/DR IN RCRD: CPT | Performed by: INTERNAL MEDICINE

## 2025-08-25 PROCEDURE — 1126F AMNT PAIN NOTED NONE PRSNT: CPT | Performed by: INTERNAL MEDICINE

## 2025-08-25 PROCEDURE — 1170F FXNL STATUS ASSESSED: CPT | Performed by: INTERNAL MEDICINE

## 2025-08-26 DIAGNOSIS — J30.9 ALLERGIC RHINITIS, UNSPECIFIED SEASONALITY, UNSPECIFIED TRIGGER: ICD-10-CM

## 2025-08-26 RX ORDER — OMEPRAZOLE 20 MG/1
20 CAPSULE, DELAYED RELEASE ORAL DAILY
Qty: 90 CAPSULE | Refills: 3 | Status: SHIPPED | OUTPATIENT
Start: 2025-08-26

## 2025-08-26 RX ORDER — FLUTICASONE PROPIONATE 50 MCG
2 SPRAY, SUSPENSION (ML) NASAL DAILY
Qty: 16 G | Refills: 5 | Status: SHIPPED | OUTPATIENT
Start: 2025-08-26

## (undated) DEVICE — TBG SMPL FLTR LINE NASL 02/C02 A/ BX/100

## (undated) DEVICE — ENDOGATOR AUXILIARY WATER JET CONNECTOR: Brand: ENDOGATOR

## (undated) DEVICE — THE SINGLE USE ETRAP – POLYP TRAP IS USED FOR SUCTION RETRIEVAL OF ENDOSCOPICALLY REMOVED POLYPS.: Brand: ETRAP

## (undated) DEVICE — CUFF,BP,DISP,1 TUBE,ADULT,HP: Brand: MEDLINE

## (undated) DEVICE — SENSR O2 OXIMAX FNGR A/ 18IN NONSTR

## (undated) DEVICE — Device: Brand: DEFENDO AIR/WATER/SUCTION AND BIOPSY VALVE

## (undated) DEVICE — SNAR POLYP SENSATION MICRO OVL 13 240X40

## (undated) DEVICE — THE CHANNEL CLEANING BRUSH IS A NYLON FLEXI BRUSH ATTACHED TO A FLEXIBLE PLASTIC SHEATH DESIGNED TO SAFELY REMOVE DEBRIS FROM FLEXIBLE ENDOSCOPES.

## (undated) DEVICE — MASK,OXYGEN,MED CONC,ADLT,7' TUB, UC: Brand: PENDING

## (undated) DEVICE — SINGLE-USE POLYPECTOMY SNARE: Brand: CAPTIVATOR II

## (undated) DEVICE — YANKAUER,BULB TIP WITH VENT: Brand: ARGYLE